# Patient Record
Sex: MALE | Race: WHITE | NOT HISPANIC OR LATINO | Employment: OTHER | URBAN - METROPOLITAN AREA
[De-identification: names, ages, dates, MRNs, and addresses within clinical notes are randomized per-mention and may not be internally consistent; named-entity substitution may affect disease eponyms.]

---

## 2017-07-11 ENCOUNTER — LAB VISIT (OUTPATIENT)
Dept: LAB | Facility: HOSPITAL | Age: 77
End: 2017-07-11
Payer: MEDICARE

## 2017-07-11 DIAGNOSIS — C61 PROSTATE CANCER: ICD-10-CM

## 2017-07-11 DIAGNOSIS — Z90.79 HISTORY OF PROSTATECTOMY: ICD-10-CM

## 2017-07-11 LAB — COMPLEXED PSA SERPL-MCNC: 0.02 NG/ML

## 2017-07-11 PROCEDURE — 84153 ASSAY OF PSA TOTAL: CPT

## 2017-07-11 PROCEDURE — 36415 COLL VENOUS BLD VENIPUNCTURE: CPT

## 2017-07-20 ENCOUNTER — OFFICE VISIT (OUTPATIENT)
Dept: UROLOGY | Facility: CLINIC | Age: 77
End: 2017-07-20
Payer: MEDICARE

## 2017-07-20 VITALS
BODY MASS INDEX: 22.63 KG/M2 | DIASTOLIC BLOOD PRESSURE: 58 MMHG | HEART RATE: 69 BPM | SYSTOLIC BLOOD PRESSURE: 116 MMHG | WEIGHT: 136 LBS

## 2017-07-20 DIAGNOSIS — C61 PROSTATE CANCER: Primary | ICD-10-CM

## 2017-07-20 DIAGNOSIS — Z90.79 HISTORY OF RADICAL PROSTATECTOMY: ICD-10-CM

## 2017-07-20 PROCEDURE — 99999 PR PBB SHADOW E&M-EST. PATIENT-LVL III: CPT | Mod: PBBFAC,,, | Performed by: NURSE PRACTITIONER

## 2017-07-20 PROCEDURE — 99213 OFFICE O/P EST LOW 20 MIN: CPT | Mod: PBBFAC | Performed by: NURSE PRACTITIONER

## 2017-07-20 PROCEDURE — 1126F AMNT PAIN NOTED NONE PRSNT: CPT | Mod: ,,, | Performed by: NURSE PRACTITIONER

## 2017-07-20 PROCEDURE — 99213 OFFICE O/P EST LOW 20 MIN: CPT | Mod: S$PBB,,, | Performed by: NURSE PRACTITIONER

## 2017-07-20 PROCEDURE — 1159F MED LIST DOCD IN RCRD: CPT | Mod: ,,, | Performed by: NURSE PRACTITIONER

## 2017-07-20 NOTE — PROGRESS NOTES
Subjective:       Patient ID: Jordan Stack is a 76 y.o. male.    Chief Complaint: Prostate Cancer    Mr. Stack is 75 y/o male here today for 6 month follow up appt for Prostate Cancer; s/p RALP 11/01/2010;  Daryl 6(3+3); No extension; No SV or Margins  Last seen in clinic with 07/19/2016.                   PSA                  0.02                07/11/2017                 PSA                  0.02                07/06/2016                 PSA                  0.02                07/20/2015                 PSA                  0.02                11/13/2014                 PSA                  0.02                05/26/2014                 PSA                  0.02                11/25/2013                    PSA                      0.02                04/29/2013                 PSA                      0.02                10/22/2012                 PSA                      0.02                04/18/2012                 PSA                      0.02                10/19/2011                 PSA                      0.02                04/05/2011                 PSA                      0.05                11/30/2010                 PSA                      4.0                 09/08/2010                          He voices no complaints.     He reports good control of bladder.  No abdominal or bone pain.  Normal activity.  ED after RALP; ok with how things are; not interested in further tx at this time.      ______________________________________________________________________    FINAL PATHOLOGIC DIAGNOSIS  1. THREE RIGHT AND  2. ONE LEFT PELVIC LYMPH NODES:  NO CARCINOMA IDENTIFIED.  3. RADICAL PROSTATECTOMY SPECIMEN:  A RELATIVELY SMALL TOTAL VOLUME OF ADENOCARCINOMA, TOTAL  DARYL SCORE 6 (3 + 3) WITH NO CARCINOMA IDENTIFIED IN SEMINAL VESICLES OR INKED MARGINS.        Past Medical History:    Prostate cancer                                               Basal cell carcinoma                                             Comment:L superior central forehead    Past Surgical History:    PROSTATECTOMY                                                  Review of patient's family history indicates:    Heart disease                  Father                      Comment: rheumatic heart      Social History    Marital Status:              Spouse Name:                       Years of Education:                 Number of children:               Occupational History  Occupation          Employer            Comment                   Social History Main Topics    Smoking Status: Never Smoker                      Smokeless Status: Not on file                       Alcohol Use: Yes                Comment: occ    Drug Use: Not on file     Sexual Activity: Not on file          Other Topics            Concern    None on file    Social History Narrative    [place]  for dept of .3 kids, 2 boys and girl, 6 living kids.Walking 6 miles per week.    dtr with MS, son with carcinoid another round of testing. Both kids are stable.    Grand son at [employer] med school.        Allergies:  No known allergies    Medications:  Current outpatient prescriptions: biotin 2,500 mcg Cap, , Disp: , Rfl: ;  FLUZONE HIGH-DOSE 2014-15, PF, 180 mcg/0.5 mL Syrg, , Disp: , Rfl: 0;  multivitamin-minerals-lutein (CENTRUM SILVER) Tab, Take by mouth. 1 Tablet Oral Every day, Disp: , Rfl: ;  omega-3 fatty acids-fish oil (FISH OIL) 360-1,200 mg Cap, Take by mouth. 2 Capsule Oral Every day, Disp: , Rfl: ;  polyethylene glycol (GLYCOLAX) 17 gram PwPk, Take by mouth., Disp: , Rfl:   tadalafil (CIALIS) 20 MG tablet, Take 1 tablet (20 mg total) by mouth daily as needed for Erectile Dysfunction. 1 Tablet Oral As directed.  take as directed, Disp: 6 tablet, Rfl: 6              Review of Systems   Constitutional: Negative.  Negative for activity change, appetite change and fever.   HENT: Negative.  Negative for facial swelling and trouble swallowing.     Eyes: Negative.    Respiratory: Negative.  Negative for shortness of breath.    Cardiovascular: Negative.  Negative for chest pain and palpitations.   Gastrointestinal: Negative for abdominal pain, constipation, diarrhea, nausea and vomiting.   Genitourinary: Positive for nocturia. Negative for difficulty urinating, dysuria, enuresis, flank pain, frequency, genital sores, hematuria, penile pain, scrotal swelling, testicular pain and urgency.        FOS is good;  Nocturia 2x.  Pleased with how he urinates.  Ok with ED   Musculoskeletal: Negative for back pain, gait problem and neck stiffness.   Skin: Negative.  Negative for wound.   Neurological: Negative for dizziness, tremors, seizures, syncope, speech difficulty, light-headedness and headaches.   Hematological: Does not bruise/bleed easily.   Psychiatric/Behavioral: Negative for confusion and hallucinations. The patient is not nervous/anxious.        Objective:      Physical Exam   Nursing note and vitals reviewed.  Constitutional: He is oriented to person, place, and time. Vital signs are normal. He appears well-developed and well-nourished. He is active and cooperative.  Non-toxic appearance. He does not have a sickly appearance.   HENT:   Head: Normocephalic and atraumatic.   Right Ear: External ear normal.   Left Ear: External ear normal.   Nose: Nose normal.   Mouth/Throat: Mucous membranes are normal.   Eyes: Conjunctivae and lids are normal. No scleral icterus.   Neck: Trachea normal, normal range of motion and full passive range of motion without pain. Neck supple. No JVD present. No tracheal deviation present.   Cardiovascular: Normal rate, regular rhythm, S1 normal and S2 normal.    Pulmonary/Chest: Effort normal and breath sounds normal. No respiratory distress. He exhibits no tenderness.   Abdominal: Soft. Normal appearance and bowel sounds are normal. There is no hepatosplenomegaly. There is no tenderness. There is no rebound, no guarding and no CVA  tenderness.   Genitourinary: Rectum normal, testes normal and penis normal. Rectal exam shows no external hemorrhoid, no mass and no tenderness. Right testis shows no mass, no swelling and no tenderness. Left testis shows no mass, no swelling and no tenderness. Circumcised. No hypospadias, penile erythema or penile tenderness. No discharge found.       Musculoskeletal: Normal range of motion.   Lymphadenopathy: No inguinal adenopathy noted on the right or left side.   Neurological: He is alert and oriented to person, place, and time. He has normal strength.   Skin: Skin is warm, dry and intact.     Psychiatric: He has a normal mood and affect. His behavior is normal. Judgment and thought content normal.       Assessment:       1. Prostate cancer    2. History of radical prostatectomy        Plan:         I spent 20 minutes with the patient of which more than half was spent in direct consultation with the patient in regards to our treatment and plan.    Education and recommendations of today's plan of care including home remedies.  Diet modifications  Discussed PSA annually up to 10 years post op (2020); ok to be done at PCP's.  Monitor ED  RTC one year with PSA unless seen by PCP

## 2017-11-07 ENCOUNTER — TELEPHONE (OUTPATIENT)
Dept: INTERNAL MEDICINE | Facility: CLINIC | Age: 77
End: 2017-11-07

## 2017-11-07 NOTE — TELEPHONE ENCOUNTER
----- Message from Court Miranda sent at 11/7/2017 12:18 PM CST -----  Contact: self/[phone]  Pt called in regard to talking to the dr about having eye surgery.      Please advise

## 2017-11-07 NOTE — TELEPHONE ENCOUNTER
Hi, 20 minute slot at his convenience is fine with me.    11/9 at 1140 I could add him on.    Thank you, Haja Chaidez

## 2017-11-07 NOTE — TELEPHONE ENCOUNTER
Spoke to patient and is going to have cataract surgery and will need to have preop clearance ---patient currently is scheduled for his annual in December but is wanting to come in sooner for the annual and clearance----pls advise

## 2017-11-16 ENCOUNTER — TELEPHONE (OUTPATIENT)
Dept: INTERNAL MEDICINE | Facility: CLINIC | Age: 77
End: 2017-11-16

## 2017-11-16 ENCOUNTER — OFFICE VISIT (OUTPATIENT)
Dept: INTERNAL MEDICINE | Facility: CLINIC | Age: 77
End: 2017-11-16
Payer: MEDICARE

## 2017-11-16 VITALS
SYSTOLIC BLOOD PRESSURE: 112 MMHG | WEIGHT: 134.25 LBS | HEART RATE: 59 BPM | HEIGHT: 65 IN | BODY MASS INDEX: 22.37 KG/M2 | OXYGEN SATURATION: 98 % | DIASTOLIC BLOOD PRESSURE: 60 MMHG

## 2017-11-16 DIAGNOSIS — Z01.818 PREOP EXAMINATION: Primary | ICD-10-CM

## 2017-11-16 DIAGNOSIS — H26.9 CATARACT, UNSPECIFIED CATARACT TYPE, UNSPECIFIED LATERALITY: ICD-10-CM

## 2017-11-16 PROCEDURE — 99999 PR PBB SHADOW E&M-EST. PATIENT-LVL III: CPT | Mod: PBBFAC,,, | Performed by: INTERNAL MEDICINE

## 2017-11-16 PROCEDURE — 99213 OFFICE O/P EST LOW 20 MIN: CPT | Mod: S$PBB,,, | Performed by: INTERNAL MEDICINE

## 2017-11-16 PROCEDURE — 99213 OFFICE O/P EST LOW 20 MIN: CPT | Mod: PBBFAC | Performed by: INTERNAL MEDICINE

## 2017-11-16 RX ORDER — CIPROFLOXACIN HYDROCHLORIDE 3 MG/ML
SOLUTION/ DROPS OPHTHALMIC
COMMUNITY
Start: 2017-11-07 | End: 2018-11-09

## 2017-11-16 RX ORDER — NEPAFENAC 3 MG/ML
SUSPENSION/ DROPS OPHTHALMIC
COMMUNITY
Start: 2017-11-11 | End: 2018-11-09

## 2017-11-16 RX ORDER — POLYETHYLENE GLYCOL 3350 17 G/17G
POWDER, FOR SOLUTION ORAL
COMMUNITY
End: 2022-11-22

## 2017-11-16 RX ORDER — DUREZOL 0.5 MG/ML
EMULSION OPHTHALMIC
COMMUNITY
Start: 2017-11-07 | End: 2018-11-09

## 2017-11-16 NOTE — PROGRESS NOTES
Subjective:       Patient ID: Jordan Stack is a 77 y.o. male.    Chief Complaint: Annual Exam and Pre-op Exam    Here for annual exam/check in.    Here for preop cataract surgery as well.    No new issues since last visit one year ago.    L hip pains has eased.      Review of Systems   Constitutional: Negative for activity change and unexpected weight change.   HENT: Negative for hearing loss, rhinorrhea and trouble swallowing.    Eyes: Negative for discharge and visual disturbance.   Respiratory: Negative for chest tightness and wheezing.    Cardiovascular: Negative for chest pain and palpitations.   Gastrointestinal: Negative for blood in stool, constipation, diarrhea and vomiting.   Endocrine: Negative for polydipsia and polyuria.   Genitourinary: Negative for difficulty urinating, hematuria and urgency.   Musculoskeletal: Negative for arthralgias, joint swelling and neck pain.   Neurological: Negative for weakness and headaches.   Psychiatric/Behavioral: Negative for confusion and dysphoric mood.       Objective:      Physical Exam   Constitutional: He is oriented to person, place, and time. He appears well-developed and well-nourished. No distress.   HENT:   Head: Normocephalic and atraumatic.   Eyes: No scleral icterus.   Neck: Normal range of motion. No thyromegaly present.   No carotid bruits and normal pulsation   Cardiovascular: Normal rate, regular rhythm and normal heart sounds.  Exam reveals no gallop and no friction rub.    No murmur heard.  Pulmonary/Chest: Effort normal and breath sounds normal. No respiratory distress. He has no wheezes. He has no rales.   Abdominal: Soft. Bowel sounds are normal. He exhibits no distension and no mass. There is no tenderness. There is no rebound and no guarding.   Musculoskeletal: Normal range of motion. He exhibits no edema.   No swollen jts on exam today.   Lymphadenopathy:     He has no cervical adenopathy.   Neurological: He is alert and oriented to  person, place, and time.   Skin: No lesion noted.   Psychiatric: He has a normal mood and affect. Thought content normal.       Assessment:       No diagnosis found.    Plan:         Here for preop for cataract surgery.    Mr. Jordan Stack is at low risk for a low risk cataract surgery. I do not see any need for preop testing, he is medically cleared.    Overall health:  Hx of prostate ca, SHIREEN, I will resume PSA next time I see him.    H/o augustine lipids, offered recheck today, he declines.    We discussed lack of utility regarding car US, consider use to decide on statin in future.    There are no diagnoses linked to this encounter.    Health Maintenance       Date Due Completion Date    Lipid Panel 11/15/2021 11/15/2016    TETANUS VACCINE 11/15/2026 11/15/2016 (Not Clinical)    Override on 11/15/2016: Not Clinically Appropriate (dec)          Return in about 1 year (around 11/16/2018).

## 2017-11-16 NOTE — LETTER
November 16, 2017        Zay Ortiz MD  [address]  [place] [address]             [facility]  [address]  [place] [address]  Phone: [phone]  Fax: [phone]   Patient: Jordan Stack   MR Number: [id number]   YOB: 1940   Date of Visit: 11/16/2017       Dear Dr. Ortiz:    Thank you for referring Jordan Stack to me for evaluation. Attached you will find relevant portions of my assessment and plan of care.    If you have questions, please do not hesitate to call me. I look forward to following Jordan Stack along with you.    Sincerely,      Haja Chaidez MD            CC  No Recipients    Enclosure

## 2018-10-02 ENCOUNTER — TELEPHONE (OUTPATIENT)
Dept: INTERNAL MEDICINE | Facility: CLINIC | Age: 78
End: 2018-10-02

## 2018-11-09 ENCOUNTER — OFFICE VISIT (OUTPATIENT)
Dept: INTERNAL MEDICINE | Facility: CLINIC | Age: 78
End: 2018-11-09
Payer: MEDICARE

## 2018-11-09 ENCOUNTER — LAB VISIT (OUTPATIENT)
Dept: LAB | Facility: HOSPITAL | Age: 78
End: 2018-11-09
Attending: INTERNAL MEDICINE
Payer: MEDICARE

## 2018-11-09 VITALS
DIASTOLIC BLOOD PRESSURE: 60 MMHG | WEIGHT: 134.5 LBS | SYSTOLIC BLOOD PRESSURE: 116 MMHG | HEIGHT: 65 IN | OXYGEN SATURATION: 99 % | BODY MASS INDEX: 22.41 KG/M2 | HEART RATE: 67 BPM

## 2018-11-09 DIAGNOSIS — Z90.79 HISTORY OF PROSTATECTOMY: ICD-10-CM

## 2018-11-09 DIAGNOSIS — Z85.46 HISTORY OF PROSTATE CANCER: ICD-10-CM

## 2018-11-09 DIAGNOSIS — E55.9 VITAMIN D DEFICIENCY: ICD-10-CM

## 2018-11-09 DIAGNOSIS — Z13.6 SCREENING FOR CARDIOVASCULAR CONDITION: ICD-10-CM

## 2018-11-09 DIAGNOSIS — R79.89 LOW VITAMIN D LEVEL: ICD-10-CM

## 2018-11-09 DIAGNOSIS — T45.2X4D: Primary | ICD-10-CM

## 2018-11-09 DIAGNOSIS — T45.2X4D: ICD-10-CM

## 2018-11-09 LAB
25(OH)D3+25(OH)D2 SERPL-MCNC: 35 NG/ML
ALBUMIN SERPL BCP-MCNC: 4.2 G/DL
ALP SERPL-CCNC: 84 U/L
ALT SERPL W/O P-5'-P-CCNC: 18 U/L
ANION GAP SERPL CALC-SCNC: 6 MMOL/L
AST SERPL-CCNC: 25 U/L
BILIRUB SERPL-MCNC: 0.8 MG/DL
BUN SERPL-MCNC: 17 MG/DL
CALCIUM SERPL-MCNC: 10 MG/DL
CHLORIDE SERPL-SCNC: 105 MMOL/L
CHOLEST SERPL-MCNC: 207 MG/DL
CHOLEST/HDLC SERPL: 3.7 {RATIO}
CO2 SERPL-SCNC: 29 MMOL/L
COMPLEXED PSA SERPL-MCNC: 0.01 NG/ML
CREAT SERPL-MCNC: 1 MG/DL
EST. GFR  (AFRICAN AMERICAN): >60 ML/MIN/1.73 M^2
EST. GFR  (NON AFRICAN AMERICAN): >60 ML/MIN/1.73 M^2
GLUCOSE SERPL-MCNC: 89 MG/DL
HDLC SERPL-MCNC: 56 MG/DL
HDLC SERPL: 27.1 %
LDLC SERPL CALC-MCNC: 133 MG/DL
NONHDLC SERPL-MCNC: 151 MG/DL
POTASSIUM SERPL-SCNC: 5.1 MMOL/L
PROT SERPL-MCNC: 7.3 G/DL
SODIUM SERPL-SCNC: 140 MMOL/L
TRIGL SERPL-MCNC: 90 MG/DL

## 2018-11-09 PROCEDURE — 80061 LIPID PANEL: CPT

## 2018-11-09 PROCEDURE — 84153 ASSAY OF PSA TOTAL: CPT

## 2018-11-09 PROCEDURE — 80053 COMPREHEN METABOLIC PANEL: CPT

## 2018-11-09 PROCEDURE — 36415 COLL VENOUS BLD VENIPUNCTURE: CPT

## 2018-11-09 PROCEDURE — 99999 PR PBB SHADOW E&M-EST. PATIENT-LVL III: CPT | Mod: PBBFAC,,, | Performed by: INTERNAL MEDICINE

## 2018-11-09 PROCEDURE — 99214 OFFICE O/P EST MOD 30 MIN: CPT | Mod: S$PBB,,, | Performed by: INTERNAL MEDICINE

## 2018-11-09 PROCEDURE — 99213 OFFICE O/P EST LOW 20 MIN: CPT | Mod: PBBFAC | Performed by: INTERNAL MEDICINE

## 2018-11-09 PROCEDURE — 82306 VITAMIN D 25 HYDROXY: CPT

## 2018-11-09 NOTE — PROGRESS NOTES
Subjective:       Patient ID: Jordan Stack is a 78 y.o. male.    Chief Complaint: Annual Exam    Here for Patient is here for followup for chronic conditions.    Ache L shoulder area, especially if sleeping on the L arm. No inj to area, describes as a spasm and when he re-positions and gets up symptoms go away.    June 2018 jammed R ring finger. Wants examined.    Wonders if he has bunions, bilat foot pain.    Wants ears checked for wax.      Review of Systems   Constitutional: Negative for activity change and unexpected weight change.   HENT: Negative for hearing loss, rhinorrhea and trouble swallowing.    Eyes: Negative for discharge and visual disturbance.   Respiratory: Negative for chest tightness and wheezing.    Cardiovascular: Negative for chest pain and palpitations.   Gastrointestinal: Negative for blood in stool, constipation, diarrhea and vomiting.   Endocrine: Negative for polydipsia and polyuria.   Genitourinary: Negative for difficulty urinating, hematuria and urgency.   Musculoskeletal: Positive for arthralgias. Negative for joint swelling and neck pain.   Neurological: Negative for weakness and headaches.   Psychiatric/Behavioral: Negative for confusion and dysphoric mood.       Objective:      Physical Exam   Constitutional: He is oriented to person, place, and time. He appears well-developed and well-nourished. No distress.   HENT:   Head: Normocephalic and atraumatic.   R ear cerumen filled, left partial   Eyes: No scleral icterus.   Neck: Normal range of motion. No thyromegaly present.   No carotid bruits and normal pulsation   Cardiovascular: Normal rate, regular rhythm and normal heart sounds. Exam reveals no gallop and no friction rub.   No murmur heard.  Pulmonary/Chest: Effort normal and breath sounds normal. No respiratory distress. He has no wheezes. He has no rales.   Abdominal: Soft. Bowel sounds are normal. He exhibits no distension and no mass. There is no tenderness. There is  no rebound and no guarding.   Musculoskeletal: Normal range of motion. He exhibits no edema.   Normal L shoulder and neck rom, and no impingement symptoms and signs    R 4th finger with mildly swollen PIP jt dorsal and finger does not appear to fully extend at PIP    bilat flat feet, no significant bunions seen   Lymphadenopathy:     He has no cervical adenopathy.   Neurological: He is alert and oriented to person, place, and time.   Skin: No lesion noted.   Psychiatric: He has a normal mood and affect. Thought content normal.       Assessment:       1. Vitamin D overdose, undetermined intent, subsequent encounter    2. Screening for cardiovascular condition    3. History of prostatectomy (11/01/2010)    4. History of prostate cancer    5. Low vitamin D level    6. Vitamin D deficiency        Plan:       Jordan LEVIN  was seen today for annual exam.    Diagnoses and all orders for this visit:    Vitamin D overdose, undetermined intent, subsequent encounter  -     Vitamin D; Future    Screening for cardiovascular condition  -     Lipid panel; Future    History of prostatectomy (11/01/2010)  -     Prostate Specific Antigen, Diagnostic; Future  -     Comprehensive metabolic panel; Future    History of prostate cancer  -     Prostate Specific Antigen, Diagnostic; Future    Low vitamin D level  -     Vitamin D; Future    Vitamin D deficiency  -     Vitamin D; Future    Flat feet -- he has good shoes, declined podiatry referral.    4th finger on R possible avulsion fx 4 mos ago, since no pain or dysfunction no need referral.    L shoulder area pain when laying on shoulder -- not sure cause, normal exam. He will let us know if symptoms worsen    Patient Instructions   Try over the counter debrox for ear wax    Declines ENT referral    Health Maintenance       Date Due Completion Date    Lipid Panel 11/15/2021 11/15/2016    TETANUS VACCINE 11/15/2026 11/15/2016 (ClinicallyNA)    Override on 11/15/2016: Not Clinically  Appropriate (dec)          Follow-up in about 1 year (around 11/9/2019).    No future appointments.

## 2019-04-22 ENCOUNTER — PATIENT MESSAGE (OUTPATIENT)
Dept: INTERNAL MEDICINE | Facility: CLINIC | Age: 79
End: 2019-04-22

## 2019-04-22 DIAGNOSIS — H91.90 HEARING LOSS, UNSPECIFIED HEARING LOSS TYPE, UNSPECIFIED LATERALITY: Primary | ICD-10-CM

## 2019-04-23 NOTE — TELEPHONE ENCOUNTER
Hi, please contact the patient to assist in scheduling    Orders Placed This Encounter    Ambulatory Referral to Audiology     For him and his wife (separate message sent for her)  Thank you, Haja Chaidez

## 2019-05-16 ENCOUNTER — CLINICAL SUPPORT (OUTPATIENT)
Dept: AUDIOLOGY | Facility: CLINIC | Age: 79
End: 2019-05-16
Payer: MEDICARE

## 2019-05-16 DIAGNOSIS — H91.93 HIGH FREQUENCY HEARING LOSS OF BOTH EARS: Primary | ICD-10-CM

## 2019-05-16 PROCEDURE — 92557 COMPREHENSIVE HEARING TEST: CPT | Mod: PBBFAC | Performed by: AUDIOLOGIST

## 2019-05-16 PROCEDURE — 99211 OFF/OP EST MAY X REQ PHY/QHP: CPT | Mod: PBBFAC,25

## 2019-05-16 PROCEDURE — 99999 PR PBB SHADOW E&M-EST. PATIENT-LVL I: ICD-10-PCS | Mod: PBBFAC,,,

## 2019-05-16 PROCEDURE — 92567 TYMPANOMETRY: CPT | Mod: PBBFAC | Performed by: AUDIOLOGIST

## 2019-05-16 PROCEDURE — 99999 PR PBB SHADOW E&M-EST. PATIENT-LVL I: CPT | Mod: PBBFAC,,,

## 2019-05-16 NOTE — PROGRESS NOTES
Mr. Stack was seen today for a hearing evaluation.     Pure tone audiometry revealed a mild-moderate high frequency hearing loss, AU  SRT and PTA are in good agreement bilaterally.  Excellent speech discrimination scores bilaterally   Tympanometry revealed Type A (normal middle ear function), bilaterally      Recommendations:  1. Otologic Evaluation  2. Annual Audiogram   3. Hearing Protection

## 2019-10-22 ENCOUNTER — PATIENT OUTREACH (OUTPATIENT)
Dept: ADMINISTRATIVE | Facility: HOSPITAL | Age: 79
End: 2019-10-22

## 2019-11-05 ENCOUNTER — LAB VISIT (OUTPATIENT)
Dept: LAB | Facility: HOSPITAL | Age: 79
End: 2019-11-05
Attending: INTERNAL MEDICINE
Payer: MEDICARE

## 2019-11-05 ENCOUNTER — IMMUNIZATION (OUTPATIENT)
Dept: PHARMACY | Facility: CLINIC | Age: 79
End: 2019-11-05
Payer: COMMERCIAL

## 2019-11-05 ENCOUNTER — OFFICE VISIT (OUTPATIENT)
Dept: INTERNAL MEDICINE | Facility: CLINIC | Age: 79
End: 2019-11-05
Payer: MEDICARE

## 2019-11-05 VITALS
SYSTOLIC BLOOD PRESSURE: 132 MMHG | HEIGHT: 65 IN | DIASTOLIC BLOOD PRESSURE: 78 MMHG | BODY MASS INDEX: 22.66 KG/M2 | OXYGEN SATURATION: 99 % | WEIGHT: 136 LBS | HEART RATE: 60 BPM

## 2019-11-05 DIAGNOSIS — Z85.46 HISTORY OF PROSTATE CANCER: ICD-10-CM

## 2019-11-05 DIAGNOSIS — Z85.46 HISTORY OF PROSTATE CANCER: Primary | ICD-10-CM

## 2019-11-05 LAB — COMPLEXED PSA SERPL-MCNC: 0.01 NG/ML (ref 0–4)

## 2019-11-05 PROCEDURE — 90662 IIV NO PRSV INCREASED AG IM: CPT | Mod: PBBFAC

## 2019-11-05 PROCEDURE — 99999 PR PBB SHADOW E&M-EST. PATIENT-LVL III: CPT | Mod: PBBFAC,,, | Performed by: INTERNAL MEDICINE

## 2019-11-05 PROCEDURE — 84153 ASSAY OF PSA TOTAL: CPT

## 2019-11-05 PROCEDURE — 36415 COLL VENOUS BLD VENIPUNCTURE: CPT

## 2019-11-05 PROCEDURE — 99213 OFFICE O/P EST LOW 20 MIN: CPT | Mod: PBBFAC | Performed by: INTERNAL MEDICINE

## 2019-11-05 PROCEDURE — 99999 PR PBB SHADOW E&M-EST. PATIENT-LVL III: ICD-10-PCS | Mod: PBBFAC,,, | Performed by: INTERNAL MEDICINE

## 2019-11-05 PROCEDURE — 99214 OFFICE O/P EST MOD 30 MIN: CPT | Mod: S$PBB,,, | Performed by: INTERNAL MEDICINE

## 2019-11-05 PROCEDURE — 99214 PR OFFICE/OUTPT VISIT, EST, LEVL IV, 30-39 MIN: ICD-10-PCS | Mod: S$PBB,,, | Performed by: INTERNAL MEDICINE

## 2019-11-05 NOTE — PROGRESS NOTES
Subjective:       Patient ID: Jordan Stack Jr. is a 79 y.o. male.    Chief Complaint: Annual Exam    Patient is here for followup for chronic conditions.    Mild R posterior thigh ache when rising from seated position.    Stays active with walking and cutting grass, yard and garden work. Will even lift 50 pound bags of mulch/soil at times.    Also pastime with marksmanship.      Review of Systems   Constitutional: Negative for activity change and unexpected weight change.   HENT: Negative for hearing loss, rhinorrhea and trouble swallowing.    Eyes: Negative for discharge and visual disturbance.   Respiratory: Negative for chest tightness and wheezing.    Cardiovascular: Negative for chest pain and palpitations.   Gastrointestinal: Positive for constipation. Negative for blood in stool, diarrhea and vomiting.   Endocrine: Negative for polydipsia and polyuria.   Genitourinary: Negative for difficulty urinating, hematuria and urgency.   Musculoskeletal: Negative for arthralgias, joint swelling and neck pain.   Neurological: Negative for weakness and headaches.   Psychiatric/Behavioral: Negative for confusion and dysphoric mood.       Objective:      Physical Exam   Constitutional: He is oriented to person, place, and time. He appears well-developed and well-nourished. No distress.   HENT:   Head: Normocephalic and atraumatic.   Eyes: No scleral icterus.   Neck: Normal range of motion. No thyromegaly present.   Cardiovascular: Normal rate, regular rhythm and normal heart sounds. Exam reveals no gallop and no friction rub.   No murmur heard.  Pulmonary/Chest: Effort normal and breath sounds normal. No respiratory distress. He has no wheezes. He has no rales.   Abdominal: Soft. Bowel sounds are normal. He exhibits no distension and no mass. There is no tenderness. There is no rebound and no guarding.   Musculoskeletal: Normal range of motion. He exhibits no edema.   Normal jt rom, normal hip rom. Neg SLR on the R.    Lymphadenopathy:     He has no cervical adenopathy.   Neurological: He is alert and oriented to person, place, and time.   Skin: No lesion noted.   Psychiatric: He has a normal mood and affect. Thought content normal.       Assessment:       1. History of prostate cancer        Plan:       Jordan was seen today for annual exam.    Diagnoses and all orders for this visit:    History of prostate cancer  -     Prostate Specific Antigen, Diagnostic; Future    Next yr repeat lipids.    Discussed gun safety, has firearms locked away.    Discussed safe lifting of soil and using wheelbarrows.    Health Maintenance       Date Due Completion Date    Shingles Vaccine (1 of 2) 10/16/1989 ---    Influenza Vaccine (1) 09/01/2019 11/2/2018    Lipid Panel 11/09/2023 11/9/2018    TETANUS VACCINE 11/15/2026 11/15/2016 (ClinicallyNA)    Override on 11/15/2016: Not Clinically Appropriate (dec)          Follow up in about 1 year (around 11/5/2020) for Flu and shingles please.    Future Appointments   Date Time Provider Department Center   11/5/2019  9:45 AM LAB, APPOINT[facility]

## 2019-12-30 ENCOUNTER — TELEPHONE (OUTPATIENT)
Dept: INTERNAL MEDICINE | Facility: CLINIC | Age: 79
End: 2019-12-30

## 2019-12-30 NOTE — TELEPHONE ENCOUNTER
Dr. Anglin can remove stitches.  If needs sooner appointment would recommend he schedule urgent care appointment.  Please notify caller.

## 2020-01-06 ENCOUNTER — OFFICE VISIT (OUTPATIENT)
Dept: INTERNAL MEDICINE | Facility: CLINIC | Age: 80
End: 2020-01-06
Payer: MEDICARE

## 2020-01-06 VITALS
DIASTOLIC BLOOD PRESSURE: 68 MMHG | HEIGHT: 65 IN | HEART RATE: 62 BPM | BODY MASS INDEX: 22.44 KG/M2 | OXYGEN SATURATION: 99 % | WEIGHT: 134.69 LBS | SYSTOLIC BLOOD PRESSURE: 130 MMHG

## 2020-01-06 DIAGNOSIS — S01.01XA LACERATION OF SCALP, INITIAL ENCOUNTER: Primary | ICD-10-CM

## 2020-01-06 PROCEDURE — 99999 PR PBB SHADOW E&M-EST. PATIENT-LVL III: CPT | Mod: PBBFAC,,, | Performed by: INTERNAL MEDICINE

## 2020-01-06 PROCEDURE — 99213 OFFICE O/P EST LOW 20 MIN: CPT | Mod: S$PBB,,, | Performed by: INTERNAL MEDICINE

## 2020-01-06 PROCEDURE — 99999 PR PBB SHADOW E&M-EST. PATIENT-LVL III: ICD-10-PCS | Mod: PBBFAC,,, | Performed by: INTERNAL MEDICINE

## 2020-01-06 PROCEDURE — 99213 PR OFFICE/OUTPT VISIT, EST, LEVL III, 20-29 MIN: ICD-10-PCS | Mod: S$PBB,,, | Performed by: INTERNAL MEDICINE

## 2020-01-06 PROCEDURE — 1126F PR PAIN SEVERITY QUANTIFIED, NO PAIN PRESENT: ICD-10-PCS | Mod: ,,, | Performed by: INTERNAL MEDICINE

## 2020-01-06 PROCEDURE — 1159F PR MEDICATION LIST DOCUMENTED IN MEDICAL RECORD: ICD-10-PCS | Mod: ,,, | Performed by: INTERNAL MEDICINE

## 2020-01-06 PROCEDURE — 1126F AMNT PAIN NOTED NONE PRSNT: CPT | Mod: ,,, | Performed by: INTERNAL MEDICINE

## 2020-01-06 PROCEDURE — 1159F MED LIST DOCD IN RCRD: CPT | Mod: ,,, | Performed by: INTERNAL MEDICINE

## 2020-01-06 PROCEDURE — 99213 OFFICE O/P EST LOW 20 MIN: CPT | Mod: PBBFAC | Performed by: INTERNAL MEDICINE

## 2020-01-06 NOTE — PROGRESS NOTES
"    CHIEF COMPLAINT     Chief Complaint   Patient presents with    Hospital Follow Up     need staple removal       HPI     Jordan Stack Jr. is a 79 y.o. male here today for   Scalp laceration.    Patient had mechanical fall [place] day after getting his belt caught on a mechanical chair and chair collapsed after sitting back down.   No LOC,     Personally Reviewed Patient's Medical, surgical, family and social hx. Changes updated in [facility].  Care Team updated in Epic    Review of Systems:  Review of Systems   Eyes: Negative for visual disturbance.   Neurological: Negative for headaches.   otherwise negative          PHYSICAL EXAM     /68 (BP Location: Left arm, Patient Position: Sitting, BP Method: Medium (Manual))   Pulse 62   Ht 5' 5" (1.651 m)   Wt 61.1 kg (134 lb 11.2 oz)   SpO2 99%   BMI 22.42 kg/m²     Gen: Well Appearing, NAD  HEENT: PERR, EOMI  Neuro: A&Ox3, gait normal, speech normal  Mood; Mood normal, behavior normal, thought process linear   Skin linear laceration posterior scalp stapled with good approximation of wound flaps.    Jordan Anglin        LABS     Labs reviewed;   Lab Results   Component Value Date    CREATININE 1.0 11/09/2018    BUN 17 11/09/2018     11/09/2018    K 5.1 11/09/2018     11/09/2018    CO2 29 11/09/2018         ASSESSMENT     1. Laceration of scalp, initial encounter  staple removal       Staple removal  Date/Time: 1/6/2020 9:00 AM  Location procedure was performed: [facility]  Performed by: Jordan Anglin MD  Authorized by: Jordan Anglin MD   Pre-operative diagnosis: scalp laceration  Post-operative diagnosis: scalp laceration  Body area: head/neck  Location details: scalp  Description of findings: staple removal   Wound Appearance: clean, well healed, no drainage, normal color and nontender  Staples Removed: 6  Post-removal: no dressing applied  Specimens: No  Implants: No  Patient tolerance: Patient tolerated the procedure well " with no immediate complications  Comments: Patient had 6 staples placed for head laceration after mechanical fall 12/25. Removed said staples today. Tolerated well            Plan     Jordan POONAM Stack Jr. is a 79 y.o. male with  1. Laceration of scalp, initial encounter  - staple removal  Staples removed tolerated well no complications.      Jordan Anglin MD

## 2020-02-06 ENCOUNTER — IMMUNIZATION (OUTPATIENT)
Dept: PHARMACY | Facility: CLINIC | Age: 80
End: 2020-02-06
Payer: COMMERCIAL

## 2020-07-17 DIAGNOSIS — Z71.89 COMPLEX CARE COORDINATION: ICD-10-CM

## 2020-11-02 ENCOUNTER — IMMUNIZATION (OUTPATIENT)
Dept: PHARMACY | Facility: CLINIC | Age: 80
End: 2020-11-02
Payer: COMMERCIAL

## 2020-12-07 ENCOUNTER — LAB VISIT (OUTPATIENT)
Dept: LAB | Facility: HOSPITAL | Age: 80
End: 2020-12-07
Attending: INTERNAL MEDICINE
Payer: MEDICARE

## 2020-12-07 ENCOUNTER — OFFICE VISIT (OUTPATIENT)
Dept: INTERNAL MEDICINE | Facility: CLINIC | Age: 80
End: 2020-12-07
Payer: MEDICARE

## 2020-12-07 VITALS
SYSTOLIC BLOOD PRESSURE: 124 MMHG | DIASTOLIC BLOOD PRESSURE: 72 MMHG | HEIGHT: 65 IN | BODY MASS INDEX: 22.51 KG/M2 | OXYGEN SATURATION: 99 % | HEART RATE: 61 BPM | WEIGHT: 135.13 LBS

## 2020-12-07 DIAGNOSIS — R23.3 EASY BRUISING: ICD-10-CM

## 2020-12-07 DIAGNOSIS — Z85.46 HISTORY OF PROSTATE CANCER: ICD-10-CM

## 2020-12-07 DIAGNOSIS — E78.2 MIXED HYPERLIPIDEMIA: ICD-10-CM

## 2020-12-07 DIAGNOSIS — R00.0 TACHYCARDIA: Primary | ICD-10-CM

## 2020-12-07 LAB
BASOPHILS # BLD AUTO: 0.05 K/UL (ref 0–0.2)
BASOPHILS NFR BLD: 0.7 % (ref 0–1.9)
CHOLEST SERPL-MCNC: 226 MG/DL (ref 120–199)
CHOLEST/HDLC SERPL: 3.9 {RATIO} (ref 2–5)
COMPLEXED PSA SERPL-MCNC: 0.01 NG/ML (ref 0–4)
DIFFERENTIAL METHOD: ABNORMAL
EOSINOPHIL # BLD AUTO: 0.2 K/UL (ref 0–0.5)
EOSINOPHIL NFR BLD: 2.5 % (ref 0–8)
ERYTHROCYTE [DISTWIDTH] IN BLOOD BY AUTOMATED COUNT: 12.8 % (ref 11.5–14.5)
HCT VFR BLD AUTO: 46.3 % (ref 40–54)
HDLC SERPL-MCNC: 58 MG/DL (ref 40–75)
HDLC SERPL: 25.7 % (ref 20–50)
HGB BLD-MCNC: 15.2 G/DL (ref 14–18)
IMM GRANULOCYTES # BLD AUTO: 0.01 K/UL (ref 0–0.04)
IMM GRANULOCYTES NFR BLD AUTO: 0.1 % (ref 0–0.5)
LDLC SERPL CALC-MCNC: 148.4 MG/DL (ref 63–159)
LYMPHOCYTES # BLD AUTO: 1.5 K/UL (ref 1–4.8)
LYMPHOCYTES NFR BLD: 21.3 % (ref 18–48)
MCH RBC QN AUTO: 31.7 PG (ref 27–31)
MCHC RBC AUTO-ENTMCNC: 32.8 G/DL (ref 32–36)
MCV RBC AUTO: 97 FL (ref 82–98)
MONOCYTES # BLD AUTO: 0.8 K/UL (ref 0.3–1)
MONOCYTES NFR BLD: 11.3 % (ref 4–15)
NEUTROPHILS # BLD AUTO: 4.4 K/UL (ref 1.8–7.7)
NEUTROPHILS NFR BLD: 64.1 % (ref 38–73)
NONHDLC SERPL-MCNC: 168 MG/DL
NRBC BLD-RTO: 0 /100 WBC
PLATELET # BLD AUTO: 330 K/UL (ref 150–350)
PMV BLD AUTO: 10 FL (ref 9.2–12.9)
RBC # BLD AUTO: 4.79 M/UL (ref 4.6–6.2)
TRIGL SERPL-MCNC: 98 MG/DL (ref 30–150)
WBC # BLD AUTO: 6.9 K/UL (ref 3.9–12.7)

## 2020-12-07 PROCEDURE — 85025 COMPLETE CBC W/AUTO DIFF WBC: CPT

## 2020-12-07 PROCEDURE — 93010 ELECTROCARDIOGRAM REPORT: CPT | Mod: S$PBB,,, | Performed by: INTERNAL MEDICINE

## 2020-12-07 PROCEDURE — 93010 EKG 12-LEAD: ICD-10-PCS | Mod: S$PBB,,, | Performed by: INTERNAL MEDICINE

## 2020-12-07 PROCEDURE — 93005 ELECTROCARDIOGRAM TRACING: CPT | Mod: PBBFAC | Performed by: INTERNAL MEDICINE

## 2020-12-07 PROCEDURE — 99999 PR PBB SHADOW E&M-EST. PATIENT-LVL IV: ICD-10-PCS | Mod: PBBFAC,,, | Performed by: INTERNAL MEDICINE

## 2020-12-07 PROCEDURE — 99214 OFFICE O/P EST MOD 30 MIN: CPT | Mod: PBBFAC,25 | Performed by: INTERNAL MEDICINE

## 2020-12-07 PROCEDURE — 36415 COLL VENOUS BLD VENIPUNCTURE: CPT

## 2020-12-07 PROCEDURE — 80061 LIPID PANEL: CPT

## 2020-12-07 PROCEDURE — 99214 OFFICE O/P EST MOD 30 MIN: CPT | Mod: S$PBB,,, | Performed by: INTERNAL MEDICINE

## 2020-12-07 PROCEDURE — 99999 PR PBB SHADOW E&M-EST. PATIENT-LVL IV: CPT | Mod: PBBFAC,,, | Performed by: INTERNAL MEDICINE

## 2020-12-07 PROCEDURE — 99214 PR OFFICE/OUTPT VISIT, EST, LEVL IV, 30-39 MIN: ICD-10-PCS | Mod: S$PBB,,, | Performed by: INTERNAL MEDICINE

## 2020-12-07 PROCEDURE — 84153 ASSAY OF PSA TOTAL: CPT

## 2020-12-07 NOTE — PROGRESS NOTES
Subjective:       Patient ID: Jordan Stack Jr. is a 80 y.o. male.    Chief Complaint: Annual Exam    Patient is here for followup for chronic conditions.    Caffeine will make his heart race on rare occasions, for last 70 yrs.    Bruises easily, not severe and no nose bleeding or blood in stools.      Review of Systems   Constitutional: Negative for activity change and unexpected weight change.   HENT: Negative for hearing loss, rhinorrhea and trouble swallowing.    Eyes: Negative for discharge and visual disturbance.   Respiratory: Negative for chest tightness and wheezing.    Cardiovascular: Negative for chest pain and palpitations.   Gastrointestinal: Positive for constipation (mild). Negative for blood in stool, diarrhea and vomiting.   Endocrine: Negative for polydipsia and polyuria.   Genitourinary: Negative for difficulty urinating, hematuria and urgency.   Musculoskeletal: Negative for arthralgias, joint swelling and neck pain.   Neurological: Negative for weakness and headaches.   Psychiatric/Behavioral: Negative for confusion and dysphoric mood.           Objective:      Physical Exam  Constitutional:       General: He is not in acute distress.     Appearance: He is well-developed.   HENT:      Head: Normocephalic and atraumatic.   Eyes:      General: No scleral icterus.  Neck:      Musculoskeletal: Normal range of motion.      Thyroid: No thyromegaly.   Cardiovascular:      Rate and Rhythm: Normal rate and regular rhythm.      Heart sounds: Normal heart sounds. No murmur. No friction rub. No gallop.    Pulmonary:      Effort: Pulmonary effort is normal. No respiratory distress.      Breath sounds: Normal breath sounds. No wheezing or rales.   Abdominal:      General: Bowel sounds are normal. There is no distension.      Palpations: Abdomen is soft. There is no mass.      Tenderness: There is no abdominal tenderness. There is no guarding or rebound.   Musculoskeletal: Normal range of motion.    Lymphadenopathy:      Cervical: No cervical adenopathy.   Skin:     Findings: No lesion.   Neurological:      Mental Status: He is alert and oriented to person, place, and time.   Psychiatric:         Thought Content: Thought content normal.         Assessment:       1. Tachycardia    2. History of prostate cancer    3. Easy bruising    4. Mixed hyperlipidemia        Plan:       Jordan was seen today for annual exam.    Diagnoses and all orders for this visit:    Tachycardia  -     IN OFFICE EKG 12-LEAD (to Muse)  normal    History of prostate cancer  -     Prostate Specific Antigen, Diagnostic; Future  Likely we can stop checking after this one.    Easy bruising  -     CBC Auto Differential; Future    Mixed hyperlipidemia  -     Lipid Panel; Future        Health Maintenance       Date Due Completion Date    Lipid Panel 12/07/2025 12/7/2020    TETANUS VACCINE 11/15/2026 11/15/2016 (ClinicallyNA)    Override on 11/15/2016: Not Clinically Appropriate (dec)          Follow up in about 1 year (around 12/7/2021).    No future appointments.

## 2021-01-03 ENCOUNTER — PATIENT MESSAGE (OUTPATIENT)
Dept: INTERNAL MEDICINE | Facility: CLINIC | Age: 81
End: 2021-01-03

## 2021-01-11 ENCOUNTER — IMMUNIZATION (OUTPATIENT)
Dept: INTERNAL MEDICINE | Facility: CLINIC | Age: 81
End: 2021-01-11
Payer: MEDICARE

## 2021-01-11 DIAGNOSIS — Z23 NEED FOR VACCINATION: ICD-10-CM

## 2021-01-11 PROCEDURE — 91300 COVID-19, MRNA, LNP-S, PF, 30 MCG/0.3 ML DOSE VACCINE: CPT | Mod: PBBFAC

## 2021-02-01 ENCOUNTER — IMMUNIZATION (OUTPATIENT)
Dept: INTERNAL MEDICINE | Facility: CLINIC | Age: 81
End: 2021-02-01
Payer: MEDICARE

## 2021-02-01 DIAGNOSIS — Z23 NEED FOR VACCINATION: Primary | ICD-10-CM

## 2021-02-01 PROCEDURE — 91300 PR SARS-COV- 2 COVID-19 VACCINE, NO PRSV, 30MCG/0.3ML, IM: CPT | Mod: PBBFAC

## 2021-02-01 PROCEDURE — 0002A PR IMMUNIZ ADMIN, SARS-COV-2 COVID-19 VACC, 30MCG/0.3ML, 2ND DOSE: CPT | Mod: PBBFAC

## 2021-02-01 RX ADMIN — RNA INGREDIENT BNT-162B2 0.3 ML: 0.23 INJECTION, SUSPENSION INTRAMUSCULAR at 11:02

## 2021-08-06 ENCOUNTER — PATIENT OUTREACH (OUTPATIENT)
Dept: ADMINISTRATIVE | Facility: HOSPITAL | Age: 81
End: 2021-08-06

## 2021-09-28 ENCOUNTER — PATIENT MESSAGE (OUTPATIENT)
Dept: INTERNAL MEDICINE | Facility: CLINIC | Age: 81
End: 2021-09-28

## 2021-10-05 ENCOUNTER — PES CALL (OUTPATIENT)
Dept: ADMINISTRATIVE | Facility: CLINIC | Age: 81
End: 2021-10-05

## 2021-11-16 ENCOUNTER — PATIENT MESSAGE (OUTPATIENT)
Dept: INTERNAL MEDICINE | Facility: CLINIC | Age: 81
End: 2021-11-16
Payer: MEDICARE

## 2021-11-16 DIAGNOSIS — L98.9 SKIN LESION: Primary | ICD-10-CM

## 2021-11-17 ENCOUNTER — TELEPHONE (OUTPATIENT)
Dept: INTERNAL MEDICINE | Facility: CLINIC | Age: 81
End: 2021-11-17
Payer: MEDICARE

## 2021-12-06 ENCOUNTER — OFFICE VISIT (OUTPATIENT)
Dept: INTERNAL MEDICINE | Facility: CLINIC | Age: 81
End: 2021-12-06
Payer: MEDICARE

## 2021-12-06 ENCOUNTER — LAB VISIT (OUTPATIENT)
Dept: LAB | Facility: HOSPITAL | Age: 81
End: 2021-12-06
Attending: INTERNAL MEDICINE
Payer: MEDICARE

## 2021-12-06 VITALS
DIASTOLIC BLOOD PRESSURE: 68 MMHG | RESPIRATION RATE: 18 BRPM | HEART RATE: 57 BPM | SYSTOLIC BLOOD PRESSURE: 128 MMHG | BODY MASS INDEX: 22.85 KG/M2 | HEIGHT: 65 IN | OXYGEN SATURATION: 100 % | WEIGHT: 137.13 LBS

## 2021-12-06 DIAGNOSIS — E78.2 MIXED HYPERLIPIDEMIA: Primary | ICD-10-CM

## 2021-12-06 DIAGNOSIS — Z90.79 HISTORY OF PROSTATECTOMY: ICD-10-CM

## 2021-12-06 DIAGNOSIS — R00.0 TACHYCARDIA: ICD-10-CM

## 2021-12-06 DIAGNOSIS — E78.2 MIXED HYPERLIPIDEMIA: ICD-10-CM

## 2021-12-06 LAB
CHOLEST SERPL-MCNC: 186 MG/DL (ref 120–199)
CHOLEST/HDLC SERPL: 3.3 {RATIO} (ref 2–5)
HDLC SERPL-MCNC: 57 MG/DL (ref 40–75)
HDLC SERPL: 30.6 % (ref 20–50)
LDLC SERPL CALC-MCNC: 105.2 MG/DL (ref 63–159)
NONHDLC SERPL-MCNC: 129 MG/DL
TRIGL SERPL-MCNC: 119 MG/DL (ref 30–150)

## 2021-12-06 PROCEDURE — 36415 COLL VENOUS BLD VENIPUNCTURE: CPT | Performed by: INTERNAL MEDICINE

## 2021-12-06 PROCEDURE — 99214 OFFICE O/P EST MOD 30 MIN: CPT | Mod: PBBFAC | Performed by: INTERNAL MEDICINE

## 2021-12-06 PROCEDURE — 99214 PR OFFICE/OUTPT VISIT, EST, LEVL IV, 30-39 MIN: ICD-10-PCS | Mod: S$PBB,,, | Performed by: INTERNAL MEDICINE

## 2021-12-06 PROCEDURE — 99999 PR PBB SHADOW E&M-EST. PATIENT-LVL IV: ICD-10-PCS | Mod: PBBFAC,,, | Performed by: INTERNAL MEDICINE

## 2021-12-06 PROCEDURE — 99999 PR PBB SHADOW E&M-EST. PATIENT-LVL IV: CPT | Mod: PBBFAC,,, | Performed by: INTERNAL MEDICINE

## 2021-12-06 PROCEDURE — 80061 LIPID PANEL: CPT | Performed by: INTERNAL MEDICINE

## 2021-12-06 PROCEDURE — 99214 OFFICE O/P EST MOD 30 MIN: CPT | Mod: S$PBB,,, | Performed by: INTERNAL MEDICINE

## 2022-01-11 ENCOUNTER — PATIENT MESSAGE (OUTPATIENT)
Dept: DERMATOLOGY | Facility: CLINIC | Age: 82
End: 2022-01-11

## 2022-01-11 ENCOUNTER — OFFICE VISIT (OUTPATIENT)
Dept: DERMATOLOGY | Facility: CLINIC | Age: 82
End: 2022-01-11
Payer: MEDICARE

## 2022-01-11 VITALS — BODY MASS INDEX: 22.8 KG/M2 | WEIGHT: 137 LBS

## 2022-01-11 DIAGNOSIS — R20.9 DISTURBANCE OF SKIN SENSATION: ICD-10-CM

## 2022-01-11 DIAGNOSIS — L98.9 SKIN LESION: ICD-10-CM

## 2022-01-11 DIAGNOSIS — L82.1 SEBORRHEIC KERATOSES: Primary | ICD-10-CM

## 2022-01-11 DIAGNOSIS — L81.4 LENTIGINES: ICD-10-CM

## 2022-01-11 DIAGNOSIS — Z85.828 HISTORY OF SKIN CANCER: ICD-10-CM

## 2022-01-11 DIAGNOSIS — L21.9 SEBORRHEIC DERMATITIS: ICD-10-CM

## 2022-01-11 DIAGNOSIS — L72.0 EPIDERMAL INCLUSION CYST: ICD-10-CM

## 2022-01-11 PROCEDURE — 99999 PR PBB SHADOW E&M-EST. PATIENT-LVL III: CPT | Mod: PBBFAC,,, | Performed by: DERMATOLOGY

## 2022-01-11 PROCEDURE — 99213 OFFICE O/P EST LOW 20 MIN: CPT | Mod: PBBFAC,PO | Performed by: DERMATOLOGY

## 2022-01-11 PROCEDURE — 99999 PR PBB SHADOW E&M-EST. PATIENT-LVL III: ICD-10-PCS | Mod: PBBFAC,,, | Performed by: DERMATOLOGY

## 2022-01-11 PROCEDURE — 17110 PR DESTRUCTION BENIGN LESIONS UP TO 14: ICD-10-PCS | Mod: S$PBB,,, | Performed by: DERMATOLOGY

## 2022-01-11 PROCEDURE — 17110 DESTRUCTION B9 LES UP TO 14: CPT | Mod: PBBFAC,PO | Performed by: DERMATOLOGY

## 2022-01-11 PROCEDURE — 17110 DESTRUCTION B9 LES UP TO 14: CPT | Mod: S$PBB,,, | Performed by: DERMATOLOGY

## 2022-01-11 PROCEDURE — 99204 OFFICE O/P NEW MOD 45 MIN: CPT | Mod: 25,S$PBB,, | Performed by: DERMATOLOGY

## 2022-01-11 PROCEDURE — 99204 PR OFFICE/OUTPT VISIT, NEW, LEVL IV, 45-59 MIN: ICD-10-PCS | Mod: 25,S$PBB,, | Performed by: DERMATOLOGY

## 2022-01-11 RX ORDER — ALCLOMETASONE DIPROPIONATE 0.5 MG/G
CREAM TOPICAL
Qty: 60 G | Refills: 3 | Status: SHIPPED | OUTPATIENT
Start: 2022-01-11 | End: 2022-11-22

## 2022-01-11 NOTE — PROGRESS NOTES
Subjective:       Patient ID:  Jordan Stack Jr. is a 81 y.o. male who presents for   Chief Complaint   Patient presents with    Skin Check     Previously seen per Dr [facility] has hx of skin cancers, would like skin check.  Few new spots on scalp and neck one is irritated and bleeds on right ant scalp, also rash off and on forehead.       Review of Systems   Constitutional: Negative for fever, chills, weight loss, weight gain, fatigue, night sweats and malaise.   Skin: Negative for daily sunscreen use, activity-related sunscreen use and wears hat.   Hematologic/Lymphatic: Bruises/bleeds easily.        Objective:    Physical Exam   Constitutional: He appears well-developed and well-nourished. No distress.   Neurological: He is alert and oriented to person, place, and time. He is not disoriented.   Psychiatric: He has a normal mood and affect.   Skin:   Areas Examined (abnormalities noted in diagram):   Head / Face Inspection Performed  Neck Inspection Performed  Chest / Axilla Inspection Performed  Back Inspection Performed  RUE Inspected  LUE Inspection Performed                   Diagram Legend     Erythematous scaling macule/papule c/w actinic keratosis       Vascular papule c/w angioma      Pigmented verrucoid papule/plaque c/w seborrheic keratosis      Yellow umbilicated papule c/w sebaceous hyperplasia      Irregularly shaped tan macule c/w lentigo     1-2 mm smooth white papules consistent with Milia      Movable subcutaneous cyst with punctum c/w epidermal inclusion cyst      Subcutaneous movable cyst c/w pilar cyst      Firm pink to brown papule c/w dermatofibroma      Pedunculated fleshy papule(s) c/w skin tag(s)      Evenly pigmented macule c/w junctional nevus     Mildly variegated pigmented, slightly irregular-bordered macule c/w mildly atypical nevus      Flesh colored to evenly pigmented papule c/w intradermal nevus       Pink pearly papule/plaque c/w basal cell carcinoma      Erythematous  "hyperkeratotic cursted plaque c/w SCC      Surgical scar with no sign of skin cancer recurrence      Open and closed comedones      Inflammatory papules and pustules      Verrucoid papule consistent consistent with wart     Erythematous eczematous patches and plaques     Dystrophic onycholytic nail with subungual debris c/w onychomycosis     Umbilicated papule    Erythematous-base heme-crusted tan verrucoid plaque consistent with inflamed seborrheic keratosis     Erythematous Silvery Scaling Plaque c/w Psoriasis     See annotation      Assessment / Plan:        Seborrheic keratoses  Cryosurgery procedure note:    Verbal consent from the patient is obtained including, but not limited to, risk of hypopigmentation/hyperpigmentation, scar, recurrence of lesion. Liquid nitrogen cryosurgery is applied to 1 lesion right ant scalp  to produce a freeze injury, is instructed to call if lesion does not completely resolve.    Disturbance of skin sensationBrochure provided sk s        Skin lesion  -     Ambulatory referral/consult to Dermatology    Lentigines  The "ABCD" rules to observe pigmented lesions were reviewed.  suncreen    History of skin cancer  Comments:  bcc x 2 face  Area(s) of previous NMSC evaluated with no signs of recurrence.    . No lesions suspicious for malignancy noted.    Recommend daily sun protection/avoidance and use of at least SPF 30, broad spectrum sunscreen (OTC drug).         Seborrheic dermatitis  -     alclometasone (ACLOVATE) 0.05 % cream; AAA face bid prn redness or scaling or itching  Dispense: 60 g; Refill: 3  vanicream z bar soap    Epidermal inclusion cyst  reassurance  Call if desires removal             Follow up in about 1 year (around 1/11/2023).  "

## 2022-10-06 ENCOUNTER — IMMUNIZATION (OUTPATIENT)
Dept: INTERNAL MEDICINE | Facility: CLINIC | Age: 82
End: 2022-10-06
Payer: MEDICARE

## 2022-10-06 DIAGNOSIS — Z23 NEED FOR VACCINATION: Primary | ICD-10-CM

## 2022-10-06 PROCEDURE — 0124A COVID-19, MRNA, LNP-S, BIVALENT BOOSTER, PF, 30 MCG/0.3 ML DOSE: CPT | Mod: PBBFAC,CV19

## 2022-10-06 PROCEDURE — G0008 ADMIN INFLUENZA VIRUS VAC: HCPCS | Mod: PBBFAC

## 2022-10-06 PROCEDURE — 91312 COVID-19, MRNA, LNP-S, BIVALENT BOOSTER, PF, 30 MCG/0.3 ML DOSE: CPT | Mod: PBBFAC

## 2022-11-21 ENCOUNTER — PATIENT MESSAGE (OUTPATIENT)
Dept: INTERNAL MEDICINE | Facility: CLINIC | Age: 82
End: 2022-11-21
Payer: MEDICARE

## 2022-11-22 ENCOUNTER — OFFICE VISIT (OUTPATIENT)
Dept: INTERNAL MEDICINE | Facility: CLINIC | Age: 82
End: 2022-11-22
Payer: MEDICARE

## 2022-11-22 VITALS
OXYGEN SATURATION: 99 % | BODY MASS INDEX: 23.47 KG/M2 | SYSTOLIC BLOOD PRESSURE: 138 MMHG | DIASTOLIC BLOOD PRESSURE: 75 MMHG | WEIGHT: 140.88 LBS | TEMPERATURE: 98 F | HEART RATE: 67 BPM | HEIGHT: 65 IN

## 2022-11-22 DIAGNOSIS — J06.9 UPPER RESPIRATORY TRACT INFECTION, UNSPECIFIED TYPE: Primary | ICD-10-CM

## 2022-11-22 LAB
CTP QC/QA: YES
CTP QC/QA: YES
POC MOLECULAR INFLUENZA A AGN: NEGATIVE
POC MOLECULAR INFLUENZA B AGN: NEGATIVE
SARS-COV-2 RDRP RESP QL NAA+PROBE: NEGATIVE

## 2022-11-22 PROCEDURE — 99213 PR OFFICE/OUTPT VISIT, EST, LEVL III, 20-29 MIN: ICD-10-PCS | Mod: S$PBB,,, | Performed by: INTERNAL MEDICINE

## 2022-11-22 PROCEDURE — 99214 OFFICE O/P EST MOD 30 MIN: CPT | Mod: PBBFAC | Performed by: INTERNAL MEDICINE

## 2022-11-22 PROCEDURE — 99213 OFFICE O/P EST LOW 20 MIN: CPT | Mod: S$PBB,,, | Performed by: INTERNAL MEDICINE

## 2022-11-22 PROCEDURE — 87635 SARS-COV-2 COVID-19 AMP PRB: CPT | Mod: PBBFAC | Performed by: INTERNAL MEDICINE

## 2022-11-22 PROCEDURE — 87502 INFLUENZA DNA AMP PROBE: CPT | Mod: PBBFAC | Performed by: INTERNAL MEDICINE

## 2022-11-22 PROCEDURE — 99999 PR PBB SHADOW E&M-EST. PATIENT-LVL IV: CPT | Mod: PBBFAC,,, | Performed by: INTERNAL MEDICINE

## 2022-11-22 PROCEDURE — 99999 PR PBB SHADOW E&M-EST. PATIENT-LVL IV: ICD-10-PCS | Mod: PBBFAC,,, | Performed by: INTERNAL MEDICINE

## 2022-11-22 NOTE — PROGRESS NOTES
"Subjective:       Patient ID: Jordan Stack Jr. is a 82 y.o. male.    Chief Complaint: Sore Throat (Nasal congestion mild cough)  This is an 82-year-old who presents today with upper respiratory symptoms.  He reports he has been having trouble since Sunday.  He did a COVID swab yesterday that was negative but was concerned because he has Thanksgiving coming up in family members with new young children that will be in town and wedding anniversary he reports that he usually is pretty healthy and occasionally will get an upper respiratory infection that is mild he denies any fever body aches.  He has had mostly sore throat and postnasal drip.  Started with a mild cough but minimal.  He has taken Tylenol for his throat symptoms    Sore Throat   Associated symptoms include congestion. Pertinent negatives include no shortness of breath.   Review of Systems   Constitutional:  Negative for fever.   HENT:  Positive for congestion, postnasal drip and sore throat.    Respiratory:  Negative for shortness of breath.      Objective:    Blood pressure 138/75, pulse 67, temperature 98 °F (36.7 °C), height 5' 5" (1.651 m), weight 63.9 kg (140 lb 14 oz), SpO2 99 %.   Physical Exam  Constitutional:       General: He is not in acute distress.  HENT:      Head:      Comments: Tm clear  Rhinitiis  No exudate      Mouth/Throat:      Pharynx: Oropharynx is clear.   Cardiovascular:      Rate and Rhythm: Normal rate and regular rhythm.      Heart sounds: Normal heart sounds. No murmur heard.    No friction rub. No gallop.   Pulmonary:      Effort: Pulmonary effort is normal. No respiratory distress.      Breath sounds: Normal breath sounds.   Abdominal:      Palpations: Abdomen is soft.      Tenderness: There is no abdominal tenderness.   Skin:     Findings: No erythema.   Neurological:      Mental Status: He is alert.       Assessment:       1. Upper respiratory tract infection, unspecified type          Plan:       Jordan was seen " today for sore throat.    Diagnoses and all orders for this visit:    Upper respiratory tract infection, unspecified type  -     POCT COVID-19 Rapid Screening  -     POCT Influenza A/B Molecular  Flu and covid swabs were negative      Discussed with patient in detail reviewed over-the-counter medications and treatment for viral upper respiratory infections including saline nasal spray Flonase along with Tylenol and alternating anti-inflammatory if needed for fever  He can use antihistamine such as Allegra Claritin or Zyrtec for postnasal drip  And Mucinex or Robitussin DM if cough develops for symptomatic relief    Contagion precautions discussed

## 2022-12-06 ENCOUNTER — OFFICE VISIT (OUTPATIENT)
Dept: INTERNAL MEDICINE | Facility: CLINIC | Age: 82
End: 2022-12-06
Payer: MEDICARE

## 2022-12-06 VITALS — SYSTOLIC BLOOD PRESSURE: 122 MMHG | OXYGEN SATURATION: 99 % | HEART RATE: 62 BPM | DIASTOLIC BLOOD PRESSURE: 64 MMHG

## 2022-12-06 DIAGNOSIS — E78.2 MIXED HYPERLIPIDEMIA: Primary | ICD-10-CM

## 2022-12-06 DIAGNOSIS — Z85.46 HISTORY OF PROSTATE CANCER: ICD-10-CM

## 2022-12-06 DIAGNOSIS — R00.0 TACHYCARDIA: ICD-10-CM

## 2022-12-06 PROCEDURE — 99214 PR OFFICE/OUTPT VISIT, EST, LEVL IV, 30-39 MIN: ICD-10-PCS | Mod: S$PBB,,, | Performed by: INTERNAL MEDICINE

## 2022-12-06 PROCEDURE — 99213 OFFICE O/P EST LOW 20 MIN: CPT | Mod: PBBFAC | Performed by: INTERNAL MEDICINE

## 2022-12-06 PROCEDURE — 99214 OFFICE O/P EST MOD 30 MIN: CPT | Mod: S$PBB,,, | Performed by: INTERNAL MEDICINE

## 2022-12-06 PROCEDURE — 99999 PR PBB SHADOW E&M-EST. PATIENT-LVL III: ICD-10-PCS | Mod: PBBFAC,,, | Performed by: INTERNAL MEDICINE

## 2022-12-06 PROCEDURE — 99999 PR PBB SHADOW E&M-EST. PATIENT-LVL III: CPT | Mod: PBBFAC,,, | Performed by: INTERNAL MEDICINE

## 2022-12-06 NOTE — PROGRESS NOTES
Subjective:       Patient ID: Jordan Stack Jr. is a 82 y.o. male.    Chief Complaint: Annual Exam    Patient is here for followup for chronic conditions.    Says he is feeling remarkable well.    Still gets some occasions where his heart runs fast/fluttering sensation. Episodes of tachy not exertional, last episode 2 wks ago lasted 20 seconds at most. Does not cause any pre-syncope symptoms. Has had these symptoms since HS days. Saw cardiologist when he was around 14 and no specific issue was found. Symptoms have actually improved over the yrs.    Still walking 2-3 days per week, 2 miles.    Still doing his own yardwork and lawn.    Review of Systems   Constitutional:  Negative for activity change and unexpected weight change.   HENT:  Positive for rhinorrhea. Negative for hearing loss and trouble swallowing.    Eyes:  Negative for discharge and visual disturbance.   Respiratory:  Negative for chest tightness and wheezing.    Cardiovascular:  Negative for chest pain and palpitations.   Gastrointestinal:  Negative for blood in stool, constipation, diarrhea and vomiting.   Endocrine: Negative for polydipsia and polyuria.   Genitourinary:  Negative for difficulty urinating, hematuria and urgency.   Musculoskeletal:  Negative for arthralgias, joint swelling and neck pain.   Neurological:  Negative for weakness and headaches.   Psychiatric/Behavioral:  Negative for confusion and dysphoric mood.          Objective:      Physical Exam  Vitals reviewed.   Constitutional:       General: He is not in acute distress.     Appearance: Normal appearance. He is well-developed. He is not ill-appearing, toxic-appearing or diaphoretic.   HENT:      Head: Normocephalic and atraumatic.   Eyes:      General: No scleral icterus.  Neck:      Thyroid: No thyromegaly.   Cardiovascular:      Rate and Rhythm: Normal rate and regular rhythm.      Heart sounds: Normal heart sounds. No murmur heard.    No friction rub. No gallop.    Pulmonary:      Effort: Pulmonary effort is normal. No respiratory distress.      Breath sounds: Normal breath sounds. No wheezing or rales.   Abdominal:      General: Bowel sounds are normal. There is no distension.      Palpations: Abdomen is soft. There is no mass.      Tenderness: There is no abdominal tenderness. There is no guarding or rebound.   Musculoskeletal:         General: Normal range of motion.      Cervical back: Normal range of motion.   Lymphadenopathy:      Cervical: No cervical adenopathy.   Skin:     Findings: No lesion.   Neurological:      Mental Status: He is alert and oriented to person, place, and time.   Psychiatric:         Thought Content: Thought content normal.       Assessment:       1. Mixed hyperlipidemia    2. Tachycardia    3. History of prostate cancer        Plan:       Jordan was seen today for annual exam.    Diagnoses and all orders for this visit:    Mixed hyperlipidemia  Last yrs lipids in a good range    Tachycardia  Rare symptoms. I offered holter and/or cardiology referral, he declines both but will take me up on holter if symptoms worsen    History of prostate cancer  No need for continued surveillance      Health Maintenance         Date Due Completion Date    TETANUS VACCINE 11/15/2026 11/15/2016 (ClinicallyNA)    Override on 11/15/2016: Not Clinically Appropriate (dec)    Lipid Panel 12/06/2026 12/6/2021            Follow up in about 1 year (around 12/6/2023).    No future appointments.

## 2023-06-01 ENCOUNTER — TELEPHONE (OUTPATIENT)
Dept: AUDIOLOGY | Facility: CLINIC | Age: 83
End: 2023-06-01
Payer: MEDICARE

## 2023-06-01 DIAGNOSIS — H90.3 SENSORINEURAL HEARING LOSS, BILATERAL: Primary | ICD-10-CM

## 2023-06-20 ENCOUNTER — CLINICAL SUPPORT (OUTPATIENT)
Dept: AUDIOLOGY | Facility: CLINIC | Age: 83
End: 2023-06-20
Payer: MEDICARE

## 2023-06-20 DIAGNOSIS — H90.3 SENSORINEURAL HEARING LOSS, BILATERAL: Primary | ICD-10-CM

## 2023-06-20 PROCEDURE — 92557 COMPREHENSIVE HEARING TEST: CPT | Mod: PBBFAC

## 2023-06-20 PROCEDURE — 99999 PR PBB SHADOW E&M-EST. PATIENT-LVL I: ICD-10-PCS | Mod: PBBFAC,,,

## 2023-06-20 PROCEDURE — 99999 PR PBB SHADOW E&M-EST. PATIENT-LVL I: CPT | Mod: PBBFAC,,,

## 2023-06-20 PROCEDURE — 92567 TYMPANOMETRY: CPT | Mod: PBBFAC

## 2023-06-20 PROCEDURE — 99211 OFF/OP EST MAY X REQ PHY/QHP: CPT | Mod: PBBFAC

## 2023-06-20 NOTE — PROGRESS NOTES
Jordan Stack Jr., a 82 y.o. male, was seen today in the clinic for an audiologic evaluation.  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  Mr. Stack reported he does have a history of noise exposure.  He reported he wears hearing protection when he is using his gun.  The patient denied aural fullness, otalgia, tinnitus, and dizziness.     Tympanometry revealed Type A in the right ear and Type A in the left ear.  Audiogram results revealed normal hearing sensitivity from 250-3000 Hz with an air bone gap at 500 and 1000 Hz sloping to a moderately-severe SNHL by 8000 Hz in the right ear and normal hearing sensitivity from 250-2000 Hz sloping to a moderately-severe SNHL by 8000 Hz in the left ear.  Speech reception thresholds were noted at 15 dB in the right ear and 20 dB in the left ear.  Speech discrimination scores were 100% in the right ear and 100% in the left ear.    Recommendations:  Otologic evaluation   Hearing protection when in noise  Hearing aid consultation if patient believes his quality of life and/or ability to communicate is negatively impacted by his hearing loss  Annual audiogram or sooner if change in hearing is perceived

## 2023-06-20 NOTE — Clinical Note
Good afternoon Dr. Chaidez,   Here are the results from Mr. Stack's hearing evaluation. Please let me know if you need any further information or have any questions. Thanks!  Teja Ramos

## 2023-07-07 ENCOUNTER — TELEPHONE (OUTPATIENT)
Dept: INTERNAL MEDICINE | Facility: CLINIC | Age: 83
End: 2023-07-07
Payer: MEDICARE

## 2023-07-08 ENCOUNTER — NURSE TRIAGE (OUTPATIENT)
Dept: ADMINISTRATIVE | Facility: CLINIC | Age: 83
End: 2023-07-08
Payer: MEDICARE

## 2023-07-08 ENCOUNTER — PATIENT MESSAGE (OUTPATIENT)
Dept: INTERNAL MEDICINE | Facility: CLINIC | Age: 83
End: 2023-07-08
Payer: MEDICARE

## 2023-07-08 NOTE — TELEPHONE ENCOUNTER
Spoke with pt who reports that prescription for paxlovid was supposed to be called into [facility] pharmacy, but states medication is not there. Pt informed that Prescription was sent to [address]. Verbalized understanding. OOC number given to pt. To call back for further concerns  Reason for Disposition   Caller has medicine question, adult has minor symptoms, caller declines triage, AND triager answers question    Protocols used: Medication Question Call-A-

## 2023-11-13 ENCOUNTER — IMMUNIZATION (OUTPATIENT)
Dept: INTERNAL MEDICINE | Facility: CLINIC | Age: 83
End: 2023-11-13
Payer: MEDICARE

## 2023-11-13 ENCOUNTER — OFFICE VISIT (OUTPATIENT)
Dept: INTERNAL MEDICINE | Facility: CLINIC | Age: 83
End: 2023-11-13
Payer: MEDICARE

## 2023-11-13 VITALS
DIASTOLIC BLOOD PRESSURE: 80 MMHG | SYSTOLIC BLOOD PRESSURE: 135 MMHG | HEIGHT: 65 IN | BODY MASS INDEX: 22.49 KG/M2 | WEIGHT: 135 LBS | HEART RATE: 69 BPM | OXYGEN SATURATION: 98 %

## 2023-11-13 DIAGNOSIS — Z23 NEED FOR VACCINATION: Primary | ICD-10-CM

## 2023-11-13 DIAGNOSIS — R03.0 ELEVATED SYSTOLIC BLOOD PRESSURE READING WITHOUT DIAGNOSIS OF HYPERTENSION: ICD-10-CM

## 2023-11-13 DIAGNOSIS — E78.2 MIXED HYPERLIPIDEMIA: ICD-10-CM

## 2023-11-13 DIAGNOSIS — Z85.46 HISTORY OF PROSTATE CANCER: Primary | ICD-10-CM

## 2023-11-13 PROCEDURE — G0008 ADMIN INFLUENZA VIRUS VAC: HCPCS | Mod: PBBFAC

## 2023-11-13 PROCEDURE — 99999 PR PBB SHADOW E&M-EST. PATIENT-LVL IV: CPT | Mod: PBBFAC,,, | Performed by: INTERNAL MEDICINE

## 2023-11-13 PROCEDURE — 99999 PR PBB SHADOW E&M-EST. PATIENT-LVL IV: ICD-10-PCS | Mod: PBBFAC,,, | Performed by: INTERNAL MEDICINE

## 2023-11-13 PROCEDURE — 99213 PR OFFICE/OUTPT VISIT, EST, LEVL III, 20-29 MIN: ICD-10-PCS | Mod: S$PBB,,, | Performed by: INTERNAL MEDICINE

## 2023-11-13 PROCEDURE — 99999PBSHW FLU VACCINE - QUADRIVALENT - ADJUVANTED: Mod: PBBFAC,,,

## 2023-11-13 PROCEDURE — 99999PBSHW FLU VACCINE - QUADRIVALENT - ADJUVANTED: ICD-10-PCS | Mod: PBBFAC,,,

## 2023-11-13 PROCEDURE — 99213 OFFICE O/P EST LOW 20 MIN: CPT | Mod: S$PBB,,, | Performed by: INTERNAL MEDICINE

## 2023-11-13 PROCEDURE — 99214 OFFICE O/P EST MOD 30 MIN: CPT | Mod: PBBFAC,25 | Performed by: INTERNAL MEDICINE

## 2023-11-13 NOTE — PROGRESS NOTES
Subjective:       Patient ID: Jordan Stack Jr. is a 83 y.o. male.    Chief Complaint: Annual Exam    Patient is here for followup for chronic conditions.    No new health issues.    Has been phys active with walking, cutting grass.      Review of Systems   Constitutional:  Negative for activity change and unexpected weight change.   HENT:  Negative for hearing loss, rhinorrhea and trouble swallowing.    Eyes:  Negative for discharge and visual disturbance.   Respiratory:  Negative for chest tightness and wheezing.    Cardiovascular:  Negative for chest pain and palpitations.   Gastrointestinal:  Negative for blood in stool, constipation, diarrhea and vomiting.   Endocrine: Negative for polydipsia and polyuria.   Genitourinary:  Negative for difficulty urinating, hematuria and urgency.   Musculoskeletal:  Negative for arthralgias, joint swelling and neck pain.   Neurological:  Negative for weakness and headaches.   Psychiatric/Behavioral:  Negative for confusion and dysphoric mood.            Objective:      Physical Exam  Vitals reviewed.   Constitutional:       General: He is not in acute distress.     Appearance: Normal appearance. He is well-developed. He is not ill-appearing, toxic-appearing or diaphoretic.   HENT:      Head: Normocephalic and atraumatic.   Eyes:      General: No scleral icterus.  Neck:      Thyroid: No thyromegaly.   Cardiovascular:      Rate and Rhythm: Normal rate and regular rhythm.      Heart sounds: Normal heart sounds. No murmur heard.     No friction rub. No gallop.   Pulmonary:      Effort: Pulmonary effort is normal. No respiratory distress.      Breath sounds: Normal breath sounds. No wheezing or rales.   Abdominal:      General: Bowel sounds are normal. There is no distension.      Palpations: Abdomen is soft. There is no mass.      Tenderness: There is no abdominal tenderness. There is no guarding or rebound.   Musculoskeletal:         General: Normal range of motion.       Cervical back: Normal range of motion.   Lymphadenopathy:      Cervical: No cervical adenopathy.   Skin:     Findings: No lesion.   Neurological:      Mental Status: He is alert and oriented to person, place, and time.   Psychiatric:         Thought Content: Thought content normal.         Assessment:       1. History of prostate cancer    2. Mixed hyperlipidemia    3. Elevated systolic blood pressure reading without diagnosis of hypertension        Plan:       Jordan was seen today for annual exam.    Diagnoses and all orders for this visit:    History of prostate cancer  No recurrence    Mixed hyperlipidemia  Consider recheck next year    Elevated systolic blood pressure reading without diagnosis of hypertension  elevated BP: requested that pt check his BP twice per week at [facility]/other pharmacy, record the numbers, and call back if routinely >130/85, reiterated need for weight loss and salt restriction          Health Maintenance         Date Due Completion Date    RSV Vaccine (Age 60+) (1 - 1-dose 60+ series) Never done ---    Influenza Vaccine (1) 09/01/2023 10/6/2022    COVID-19 Vaccine (6 - 2023-24 season) 09/01/2023 10/6/2022    TETANUS VACCINE 11/15/2026 11/15/2016 (ClinicallyNA)    Override on 11/15/2016: Not Clinically Appropriate (dec)    Lipid Panel 12/06/2026 12/6/2021   Flu vax today declines covid vax since had infection this summer         Follow up in about 1 year (around 11/13/2024).    No future appointments.

## 2024-01-11 DIAGNOSIS — Z00.00 ENCOUNTER FOR MEDICARE ANNUAL WELLNESS EXAM: ICD-10-CM

## 2024-03-06 DIAGNOSIS — M25.552 LEFT HIP PAIN: Primary | ICD-10-CM

## 2024-03-07 ENCOUNTER — HOSPITAL ENCOUNTER (OUTPATIENT)
Dept: RADIOLOGY | Facility: HOSPITAL | Age: 84
Discharge: HOME | End: 2024-03-07
Attending: ORTHOPAEDIC SURGERY
Payer: MEDICARE

## 2024-03-07 ENCOUNTER — OFFICE VISIT (OUTPATIENT)
Dept: ORTHOPEDICS | Facility: CLINIC | Age: 84
End: 2024-03-07
Payer: MEDICARE

## 2024-03-07 DIAGNOSIS — M53.3 SI (SACROILIAC) JOINT DYSFUNCTION: Primary | ICD-10-CM

## 2024-03-07 DIAGNOSIS — M25.552 LEFT HIP PAIN: ICD-10-CM

## 2024-03-07 PROCEDURE — 73521 X-RAY EXAM HIPS BI 2 VIEWS: CPT | Mod: 26,,, | Performed by: RADIOLOGY

## 2024-03-07 PROCEDURE — 73521 X-RAY EXAM HIPS BI 2 VIEWS: CPT | Mod: TC,FY

## 2024-03-07 PROCEDURE — 99202 OFFICE O/P NEW SF 15 MIN: CPT | Mod: S$PBB,,, | Performed by: ORTHOPAEDIC SURGERY

## 2024-03-07 PROCEDURE — 99213 OFFICE O/P EST LOW 20 MIN: CPT | Mod: PBBFAC,25,PN | Performed by: ORTHOPAEDIC SURGERY

## 2024-03-07 PROCEDURE — 99999 PR PBB SHADOW E&M-EST. PATIENT-LVL III: CPT | Mod: PBBFAC,,, | Performed by: ORTHOPAEDIC SURGERY

## 2024-03-07 NOTE — PROGRESS NOTES
Subjective:      Patient ID: Jordan Stack Jr. is a 83 y.o. male.    Chief Complaint: Consult (LEFT HIP )    HPI      Jordan Stack Jr. is seen for evaluation and treatment of hip pain.  They have experienced problems with their left hip over the past   Several months Pain is located laterally.  Symptoms reportedly started after doing intensive set ups.They have been treated with activity modification.   Symptoms have recently improved. Ambulation reportedly has not been impaired. Self care ADLs are not painful.     Review of Systems   Constitutional: Negative for fever and weight loss.   HENT:  Negative for congestion.    Eyes:  Negative for visual disturbance.   Cardiovascular:  Negative for chest pain.   Respiratory:  Negative for shortness of breath.    Hematologic/Lymphatic: Negative for bleeding problem. Does not bruise/bleed easily.   Skin:  Negative for poor wound healing.   Musculoskeletal:  Positive for joint pain.   Gastrointestinal:  Negative for abdominal pain.   Genitourinary:  Negative for dysuria.   Neurological:  Negative for seizures.   Psychiatric/Behavioral:  Negative for altered mental status.    Allergic/Immunologic: Negative for persistent infections.         Objective:            Ortho/SPM Exam        Left hip     The patient is not in acute distress.   Body habitus is:normal.   Sclerae normal  The patient walks without a limp.   Respiratory distress:  none  The skin over the hip is:intact.   There is no local tenderness.   Range of motion- Flexion full, External rotation full, internal rotation full.  Resisted SLR negative.  Pain with rotation negative  Sciatic tension findings negative.  Shortening/lengthening compared to the contralateral side absent.  Pulses DP present, PT present.  Motor normal 5/5 strength in all tested muscle groups.   Sensory normal.      IMAGING-  left hip radiographs show no fracture, no dislocation, no localized bone lesion and no loss of joint  space              Assessment:       Encounter Diagnosis   Name Primary?    SI (sacroiliac) joint dysfunction Yes              The clinical course suggests the  a lower back strain with some referred pain.  I can not rule out trochanteric bursitis/ tendinosis        Plan:       Jordan was seen today for consult.    Diagnoses and all orders for this visit:    SI (sacroiliac) joint dysfunction           I explained my diagnostic impression and the reasoning behind it in detail, using layman's terms.  Models and/or pictures were used to help in the explanation.     Based on the clinical course it seems that excessive exercise intensity was responsible for the symptoms.  Given the resolution I recommend gradual resumption of athletic activity.  Proper levels of training for the patient's overall condition were reviewed

## 2024-06-10 ENCOUNTER — PATIENT MESSAGE (OUTPATIENT)
Dept: INTERNAL MEDICINE | Facility: CLINIC | Age: 84
End: 2024-06-10
Payer: MEDICARE

## 2024-11-07 NOTE — TELEPHONE ENCOUNTER
Continued Stay / Assessment/Plan of Care Note       Active Substitute Decision Maker (SDM)    There are no active Substitute Decision Maker (SDM) on file.           Progress note:  Spoke with patient's son Delonte [phone]. He is requesting [facility] placement for patient at [facility]. Discussed criteria for placement.     See SW/CM flowsheets for other objective data.    Disposition Recommendations:  Preliminary discharge destination:    SW/CM recommendation for discharge: Home    Destination Pharmacy:        Discharge Plan/Needs:     Continued Care and Services - Admitted Since 11/2/2024       Home Medical Care Coordination complete.      Service Provider Request Status Selected Services Address Phone Fax    [facility] [address] [place] [id number] [phone]                      Devices/ Equipment that need to be arranged for discharge:     Accepted   Pending insurance authorization   Others:    Anticipated date of DME availability: N/A    Prior To Hospitalization:    Living Situation: Spouse and residing at House    .  Support Systems: Family members, Friends   Home Devices/Equipment: CPAP/BiPAP machine (T) (does not use CPAP)            Mobility Assist Devices: Four wheel walker   Type of Service Prior to Hospitalization: Homemaker, Nurse (specify)               Patient/Family discharge goal (s):  Home     Resources provided:           Prior Function                Current Function  Last Filed Values         Value Time User    Current Function  slightly below baseline level of function 11/6/2024  4:31 PM Gisselle Emanuel, PT    Therapy Impairments  balance; strength; cognition; safety awareness; pain 11/4/2024  1:50 PM Miladis Burgess, PT    ADLs Requiring Support  bed mobility; transfers; ambulation; stairs; feeding; dressing; personal hygiene; toileting 11/4/2024  1:50 PM Miladis Burgess, PT       ----- Message from Rubén Landeros sent at 10/1/2018  4:44 PM CDT -----  Contact: Self [phone]  Pt will like to have an PSA testing done on 11/9          Therapy Recommendations for Discharge:   PT:      Last Filed Values         Value Time User    PT Discharge Needs  therapy 1-3 times per week 11/6/2024  4:31 PM Gisselle Emanuel, PT          OT:       Last Filed Values         Value Time User    OT Discharge Needs  therapy 1-3 times per week 11/4/2024  2:37 PM Jenae Blunt, OTR/L          SLP:    Last Filed Values         Value Time User    SLP Discharge Needs  therapy 1-3 times per week 11/7/2024 12:00 PM Leyla Bosch, SLP            Mobility Equipment Recommended for Discharge: pt owns RW      Barriers to Discharge  Identified Barriers to Discharge/Transition Planning:

## 2024-11-12 ENCOUNTER — TELEPHONE (OUTPATIENT)
Dept: AUDIOLOGY | Facility: CLINIC | Age: 84
End: 2024-11-12
Payer: MEDICARE

## 2024-11-12 DIAGNOSIS — H90.3 SENSORINEURAL HEARING LOSS, BILATERAL: Primary | ICD-10-CM

## 2024-12-11 ENCOUNTER — CLINICAL SUPPORT (OUTPATIENT)
Dept: AUDIOLOGY | Facility: CLINIC | Age: 84
End: 2024-12-11
Payer: MEDICARE

## 2024-12-11 DIAGNOSIS — H90.3 SENSORINEURAL HEARING LOSS, BILATERAL: Primary | ICD-10-CM

## 2024-12-11 PROCEDURE — 92557 COMPREHENSIVE HEARING TEST: CPT | Mod: PBBFAC

## 2024-12-11 PROCEDURE — 92567 TYMPANOMETRY: CPT | Mod: PBBFAC

## 2024-12-11 PROCEDURE — 99211 OFF/OP EST MAY X REQ PHY/QHP: CPT | Mod: PBBFAC

## 2024-12-11 PROCEDURE — 99999 PR PBB SHADOW E&M-EST. PATIENT-LVL I: CPT | Mod: PBBFAC,,,

## 2024-12-11 NOTE — PROGRESS NOTES
Jordan Stack Jr., a 84 y.o. male, was seen today in the clinic for an audiologic evaluation. Mr. 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  Patient denied tinnitus, otalgia, aural fullness, and dizziness.    Tympanometry revealed Type A tympanogram in the right ear and Type A tympanogram in the left ear. Audiogram results revealed normal hearing sensitivity sloping to moderately severe high frequency sensorineural hearing loss (SNHL) in the right ear and normal hearing sensitivity sloping to moderately severe high frequency SNHL in the left ear.  Speech reception thresholds were noted at 20 dBHL in the right ear and 20 dBHL in the left ear.  Speech discrimination scores were 100% in the right ear and 100% in the left ear.    Recommendations:  Otologic evaluation as needed  Annual audiogram or sooner if change is perceived  Consider hearing aid consultation if patient feels hearing loss is affecting quality of life  Hearing protection in noise

## 2024-12-17 ENCOUNTER — OFFICE VISIT (OUTPATIENT)
Dept: INTERNAL MEDICINE | Facility: CLINIC | Age: 84
End: 2024-12-17
Payer: MEDICARE

## 2024-12-17 VITALS
SYSTOLIC BLOOD PRESSURE: 130 MMHG | HEIGHT: 65 IN | OXYGEN SATURATION: 98 % | WEIGHT: 138.88 LBS | BODY MASS INDEX: 23.14 KG/M2 | DIASTOLIC BLOOD PRESSURE: 62 MMHG | HEART RATE: 63 BPM

## 2024-12-17 DIAGNOSIS — R03.0 ELEVATED SYSTOLIC BLOOD PRESSURE READING WITHOUT DIAGNOSIS OF HYPERTENSION: Primary | ICD-10-CM

## 2024-12-17 DIAGNOSIS — E78.2 MIXED HYPERLIPIDEMIA: ICD-10-CM

## 2024-12-17 PROCEDURE — 99213 OFFICE O/P EST LOW 20 MIN: CPT | Mod: PBBFAC | Performed by: INTERNAL MEDICINE

## 2024-12-17 PROCEDURE — 99999 PR PBB SHADOW E&M-EST. PATIENT-LVL III: CPT | Mod: PBBFAC,,, | Performed by: INTERNAL MEDICINE

## 2024-12-17 PROCEDURE — 99213 OFFICE O/P EST LOW 20 MIN: CPT | Mod: S$PBB,,, | Performed by: INTERNAL MEDICINE

## 2024-12-17 PROCEDURE — G2211 COMPLEX E/M VISIT ADD ON: HCPCS | Mod: S$PBB,,, | Performed by: INTERNAL MEDICINE

## 2024-12-17 NOTE — PROGRESS NOTES
Subjective     Patient ID: Jordan Stack Jr. is a 84 y.o. male.    Chief Complaint: Annual Exam             History of Present Illness    CHIEF COMPLAINT:  Jordan presents today for follow up.    GENERAL HEALTH:  He reports no major new health issues in the past year.    MUSCULOSKELETAL:  He experiences occasional hip achiness after physical activity, which he manages with Tylenol at night as needed.    EXERCISE:  He exercises regularly with walking and can walk long distances without shortness of breath. He performs dumbbell exercises twice weekly and incorporates 10 repetitions of squats per session for strength training.      Home pressures average in the 120s to 130s.    HPI  Review of Systems   Constitutional:  Negative for activity change and unexpected weight change.   HENT:  Negative for hearing loss, rhinorrhea and trouble swallowing.    Eyes:  Negative for discharge and visual disturbance.   Respiratory:  Negative for chest tightness and wheezing.    Cardiovascular:  Negative for chest pain and palpitations.   Gastrointestinal:  Negative for blood in stool, constipation, diarrhea and vomiting.   Endocrine: Negative for polydipsia and polyuria.   Genitourinary:  Negative for difficulty urinating, hematuria and urgency.   Musculoskeletal:  Negative for arthralgias, joint swelling and neck pain.   Neurological:  Negative for weakness and headaches.   Psychiatric/Behavioral:  Negative for confusion and dysphoric mood.         Objective     Physical Exam  Vitals reviewed.   Constitutional:       General: He is not in acute distress.     Appearance: Normal appearance. He is well-developed. He is not ill-appearing, toxic-appearing or diaphoretic.   HENT:      Head: Normocephalic and atraumatic.   Eyes:      General: No scleral icterus.  Neck:      Thyroid: No thyromegaly.   Cardiovascular:      Rate and Rhythm: Normal rate and regular rhythm.      Heart sounds: Normal heart sounds. No murmur heard.     No  friction rub. No gallop.   Pulmonary:      Effort: Pulmonary effort is normal. No respiratory distress.      Breath sounds: Normal breath sounds. No wheezing or rales.   Abdominal:      General: Bowel sounds are normal. There is no distension.      Palpations: Abdomen is soft. There is no mass.      Tenderness: There is no abdominal tenderness. There is no guarding or rebound.   Musculoskeletal:         General: Normal range of motion.      Cervical back: Normal range of motion.   Lymphadenopathy:      Cervical: No cervical adenopathy.   Skin:     Findings: No lesion.   Neurological:      Mental Status: He is alert and oriented to person, place, and time.   Psychiatric:         Thought Content: Thought content normal.            Assessment and Plan     1. Elevated systolic blood pressure reading without diagnosis of hypertension  Home pressures are in a good range.    2. Mixed hyperlipidemia  Recheck lipids in 1-2 years.      Assessment & Plan    HIP PAIN:  - Considered patient's reports of occasional minor hip discomfort.  - Recommend maintaining exercise routine with dumbbells and squats to address minor hip discomfort.  - Continued occasional use of Tylenol for minor hip discomfort, approximately 1 time per week.    EXERCISE COUNSELING:  - Jordan to continue current level of physical activity, including yard work and walking.  - Recommend maintaining exercise routine with dumbbells and squats to address minor hip discomfort.    GENERAL HEALTH ASSESSMENT:  - Conducted physical exam including heart, lungs, stomach, and legs.  - Noted patient's overall good health and mobility.  - Determined annual labs unnecessary given patient's current health status.  - RSV and COVID vaccines ordered to be administered.                    Health Maintenance         Date Due Completion Date    TETANUS VACCINE 11/15/2026 11/15/2016 (ClinicallyNA)    Override on 11/15/2016: Not Clinically Appropriate (dec)    Lipid Panel 12/06/2026  12/6/2021          Follow up in about 1 year (around 12/17/2025) for Covid and RSV vaccines.    Visit today included increased complexity associated with the care of the episodic problem  addressed and managing the longitudinal care of the patient due to the serious and/or complex managed problem(s) .    No future appointments.        This note was generated with the assistance of ambient listening technology. Verbal consent was obtained by the patient and accompanying visitor(s) for the recording of patient appointment to facilitate this note. I attest to having reviewed and edited the generated note for accuracy, though some syntax or spelling errors may persist. Please contact the author of this note for any clarification.

## 2025-02-11 ENCOUNTER — PATIENT MESSAGE (OUTPATIENT)
Dept: INTERNAL MEDICINE | Facility: CLINIC | Age: 85
End: 2025-02-11
Payer: MEDICARE

## 2025-02-11 ENCOUNTER — TELEPHONE (OUTPATIENT)
Dept: INTERNAL MEDICINE | Facility: CLINIC | Age: 85
End: 2025-02-11
Payer: MEDICARE

## 2025-02-11 NOTE — TELEPHONE ENCOUNTER
Hi, please offer the patient an appointment with me tomorrow afternoon, wanted the open slots.  Thank you, Haja Chaidez

## 2025-02-11 NOTE — TELEPHONE ENCOUNTER
Spoke with pt an ethan apt with Dr. Fung for tomorrow for 1.20 pm in person visit.  Apt time and date confirmed by pt.

## 2025-02-12 ENCOUNTER — OFFICE VISIT (OUTPATIENT)
Dept: INTERNAL MEDICINE | Facility: CLINIC | Age: 85
End: 2025-02-12
Payer: MEDICARE

## 2025-02-12 ENCOUNTER — HOSPITAL ENCOUNTER (OUTPATIENT)
Dept: RADIOLOGY | Facility: HOSPITAL | Age: 85
Discharge: HOME | End: 2025-02-12
Attending: INTERNAL MEDICINE
Payer: MEDICARE

## 2025-02-12 VITALS
DIASTOLIC BLOOD PRESSURE: 60 MMHG | HEART RATE: 82 BPM | OXYGEN SATURATION: 99 % | WEIGHT: 139.56 LBS | BODY MASS INDEX: 23.25 KG/M2 | HEIGHT: 65 IN | SYSTOLIC BLOOD PRESSURE: 138 MMHG

## 2025-02-12 DIAGNOSIS — M25.531 WRIST PAIN, RIGHT: Primary | ICD-10-CM

## 2025-02-12 DIAGNOSIS — M25.531 WRIST PAIN, RIGHT: ICD-10-CM

## 2025-02-12 PROCEDURE — 99213 OFFICE O/P EST LOW 20 MIN: CPT | Mod: PBBFAC,25 | Performed by: INTERNAL MEDICINE

## 2025-02-12 PROCEDURE — 99213 OFFICE O/P EST LOW 20 MIN: CPT | Mod: S$PBB,,, | Performed by: INTERNAL MEDICINE

## 2025-02-12 PROCEDURE — 99999 PR PBB SHADOW E&M-EST. PATIENT-LVL III: CPT | Mod: PBBFAC,,, | Performed by: INTERNAL MEDICINE

## 2025-02-12 PROCEDURE — G2211 COMPLEX E/M VISIT ADD ON: HCPCS | Mod: S$PBB,,, | Performed by: INTERNAL MEDICINE

## 2025-02-12 PROCEDURE — 73110 X-RAY EXAM OF WRIST: CPT | Mod: TC,RT

## 2025-02-12 PROCEDURE — 73110 X-RAY EXAM OF WRIST: CPT | Mod: 26,RT,, | Performed by: RADIOLOGY

## 2025-02-12 NOTE — PROGRESS NOTES
Subjective     Patient ID: Jordan Stack Jr. is a 84 y.o. male.    Chief Complaint: Hand Pain (right) and Low-back Pain             History of Present Illness    CHIEF COMPLAINT:  Jordan presents today for right wrist pain after a fall on [place].    RIGHT WRIST PAIN:  He sustained injury after falling on wet grass while playing football on [place]. He reports pain on the top of the hand /dorsal wrist on the R, present only with activity and exacerbated by lifting objects, especially when twisting. He denies initial swelling or bruising. The injury impacts daily activities including buttoning clothes, writing, and computer use. He uses a brace for activities requiring force, such as mowing the lawn. He takes 1-2 tablets of Advil occasionally, sometimes every other day, for pain management.    LOWER BACK PAIN AND SCOLIOSIS:  He reports left-sided lower back pain that affects his sleep. He has difficulty sleeping on his left side, causing frequent position changes. He attempts to alleviate discomfort by placing pillows between his legs, with varying effectiveness. The pain is localized to the lower left back area, sometimes extending to the hip region. The pain does not impair his daily activities. He has a known history of mild scoliosis, which he has noticed due to uneven pant leg lengths.      ROS:  Constitutional: negative activity change, negative unexpected weight change  HENT: negative trouble swallowing  Eyes: negative discharge, negative visual disturbance  Respiratory: negative chest tightness, negative wheezing, negative shortness of breath  Cardiovascular: negative chest pain, negative palpitations  Gastrointestinal: negative blood in stool, negative constipation, negative diarrhea, negative vomiting  Endocrine: negative polydipsia, negative polyuria  Genitourinary: negative difficulty urinating, negative dysuria, negative hematuria  Musculoskeletal: negative arthralgias, negative joint  swelling, negative neck pain, +joint pain, +back pain  Neurological: negative weakness, negative headaches  Psychiatric/Behavioral: negative confusion, negative dysphoric mood         Fell with outstretched R hand/wrist    Also L sided low back ache, has been longer lasting, not radicular        HPI  Review of Systems     Objective     Physical Exam  Constitutional:       General: He is not in acute distress.     Appearance: He is well-developed. He is not diaphoretic.      Comments: Well appearing, breathing comfortably, speaking full sentences, no coughing during encounter   HENT:      Head: Normocephalic and atraumatic.   Pulmonary:      Effort: Pulmonary effort is normal. No respiratory distress.   Abdominal:      Tenderness: There is no abdominal tenderness. There is no right CVA tenderness or left CVA tenderness.   Musculoskeletal:      Comments: Tenderness along the right dorsal wrist.  Tenderness worsened with wrist flexion and extension worse with flexion.   No redness or warmth in the right wrist.  Negative Finkelstein testing.  Normal finger muscle strain, no thenar or hypothenar atrophy.  Normal light touch sensation and  strength    No midline spine lumbar tenderness.  Negative straight leg raise on the left.   Neurological:      Mental Status: He is alert and oriented to person, place, and time.   Psychiatric:         Behavior: Behavior normal.         Thought Content: Thought content normal.         Judgment: Judgment normal.            Assessment and Plan     1. Wrist pain, right  -     X-Ray Wrist Complete 3 views Right; Future; Expected date: 02/12/2025        Assessment & Plan    WRIST INJURY:  - Assessed the patient's wrist injury from a [place] fall, likely a ligament stretch/tear rather than a fracture due to lack of significant swelling or bruising.  - Performed physical exam of the wrist, testing range of motion and pain points.  - Ordered a wrist x-ray to rule out fracture and  "check for potential arthritis.  - Discussed expected healing timeline for the wrist injury, potentially taking a few months.  - Instructed the patient to perform wrist stretching exercises, including "writing the alphabet" in the air.  - Advised the patient to use a wrist brace when performing activities that may strain the wrist.  - Recommend applying cold therapy to the injured wrist area.  - Recommend OTCpain medication as needed.  - Assessed the patient's right wrist pain following a fall while playing football on [place], almost 2 months ago.  - Performed physical exam of the wrist, testing range of motion and pain points.  - Noted that the patient experiences pain with certain movements and resistance tests, especially when applying force or twisting.  - Ordered an x-ray to confirm diagnosis and rule out fracture or arthritis.  - Recommend continued use of wrist brace for activities requiring force.  - Advised applying cold therapy to the affected area.  - Recommend OTCpain medication as needed.  - Instructed the patient to perform stretching exercises, including 'writing the alphabet in the air'.    LOWER BACK PAIN:  - Evaluated the patient's lower back pain, determined to be muscular in nature rather than kidney-related.  - Noted that the patient reports lower back ache on the left side, making it difficult to sleep at night, with pain worsening when lying on the left side.  - Performed physical exam of the back, including pressure tests and leg raise, with no pain reported during exam.  - Advised that the back pain is likely to improve with time.  - No specific treatment recommended for the back pain at this time.    SCOLIOSIS:  - Confirmed detection of scoliosis during exam.  - Noted that the patient reports having scoliosis, noticed due to difference in pants leg lengths.    SHINGLES VACCINATION:  - Explained shingles vaccine efficacy and transmission risk to the patient.  - Noted that shingles is " not contagious to those who have had chickenpox or been vaccinated, except for newborns or unvaccinated individuals.  - Addressed the patient's inquiry about potential exposure to shingles from his daughter who may have the condition despite being vaccinated.    OTHER INSTRUCTIONS:  - Considered patient's ability to perform daily activities despite discomfort.  - Jordan to continue normal activities as tolerated, avoiding actions that cause significant pain.  - Continue Advil or Aleve as needed for pain, not exceeding once every 12 hours.         Return to clinic previously agreed (at last visit) next due visit      Visit today included increased complexity associated with the care of the episodic problem  addressed and managing the longitudinal care of the patient due to the serious and/or complex managed problem(s) .    No future appointments.      This note was generated with the assistance of ambient listening technology. Verbal consent was obtained by the patient and accompanying visitor(s) for the recording of patient appointment to facilitate this note. I attest to having reviewed and edited the generated note for accuracy, though some syntax or spelling errors may persist. Please contact the author of this note for any clarification.

## 2025-02-24 DIAGNOSIS — Z00.00 ENCOUNTER FOR MEDICARE ANNUAL WELLNESS EXAM: ICD-10-CM

## 2025-02-28 ENCOUNTER — RESULTS FOLLOW-UP (OUTPATIENT)
Dept: INTERNAL MEDICINE | Facility: CLINIC | Age: 85
End: 2025-02-28